# Patient Record
Sex: MALE | Employment: FULL TIME | URBAN - METROPOLITAN AREA
[De-identification: names, ages, dates, MRNs, and addresses within clinical notes are randomized per-mention and may not be internally consistent; named-entity substitution may affect disease eponyms.]

---

## 2019-12-08 ENCOUNTER — APPOINTMENT (OUTPATIENT)
Dept: CT IMAGING | Age: 59
DRG: 066 | End: 2019-12-08
Attending: EMERGENCY MEDICINE
Payer: SELF-PAY

## 2019-12-08 ENCOUNTER — HOSPITAL ENCOUNTER (INPATIENT)
Age: 59
LOS: 1 days | Discharge: NURSING, REHAB OR LONG-TERM CARE | DRG: 066 | End: 2019-12-09
Attending: EMERGENCY MEDICINE | Admitting: HOSPITALIST
Payer: SELF-PAY

## 2019-12-08 DIAGNOSIS — I63.9 CEREBROVASCULAR ACCIDENT (CVA), UNSPECIFIED MECHANISM (HCC): Primary | ICD-10-CM

## 2019-12-08 LAB
AMPHET UR QL SCN: NEGATIVE
ANION GAP BLD CALC-SCNC: 17 MMOL/L (ref 10–20)
ANION GAP SERPL CALC-SCNC: 6 MMOL/L (ref 3–18)
BARBITURATES UR QL SCN: NEGATIVE
BASOPHILS # BLD: 0 K/UL (ref 0–0.1)
BASOPHILS NFR BLD: 0 % (ref 0–2)
BENZODIAZ UR QL: NEGATIVE
BUN BLD-MCNC: 12 MG/DL (ref 7–18)
BUN SERPL-MCNC: 13 MG/DL (ref 7–18)
BUN/CREAT SERPL: 13 (ref 12–20)
CA-I BLD-MCNC: 1.16 MMOL/L (ref 1.12–1.32)
CALCIUM SERPL-MCNC: 9.3 MG/DL (ref 8.5–10.1)
CANNABINOIDS UR QL SCN: NEGATIVE
CHLORIDE BLD-SCNC: 102 MMOL/L (ref 100–108)
CHLORIDE SERPL-SCNC: 107 MMOL/L (ref 100–111)
CK MB CFR SERPL CALC: 0.9 % (ref 0–4)
CK MB SERPL-MCNC: 2 NG/ML (ref 5–25)
CK SERPL-CCNC: 219 U/L (ref 39–308)
CO2 BLD-SCNC: 25 MMOL/L (ref 19–24)
CO2 SERPL-SCNC: 28 MMOL/L (ref 21–32)
COCAINE UR QL SCN: NEGATIVE
CREAT SERPL-MCNC: 1 MG/DL (ref 0.6–1.3)
CREAT UR-MCNC: 1 MG/DL (ref 0.6–1.3)
DIFFERENTIAL METHOD BLD: ABNORMAL
EOSINOPHIL # BLD: 0.3 K/UL (ref 0–0.4)
EOSINOPHIL NFR BLD: 3 % (ref 0–5)
ERYTHROCYTE [DISTWIDTH] IN BLOOD BY AUTOMATED COUNT: 13.3 % (ref 11.6–14.5)
GLUCOSE BLD STRIP.AUTO-MCNC: 88 MG/DL (ref 70–110)
GLUCOSE BLD STRIP.AUTO-MCNC: 91 MG/DL (ref 70–110)
GLUCOSE BLD STRIP.AUTO-MCNC: 94 MG/DL (ref 74–106)
GLUCOSE SERPL-MCNC: 92 MG/DL (ref 74–99)
HCT VFR BLD AUTO: 41.3 % (ref 36–48)
HCT VFR BLD CALC: 41 % (ref 36–49)
HDSCOM,HDSCOM: NORMAL
HGB BLD-MCNC: 13.9 G/DL (ref 12–16)
HGB BLD-MCNC: 14.4 G/DL (ref 13–16)
LYMPHOCYTES # BLD: 2.8 K/UL (ref 0.9–3.6)
LYMPHOCYTES NFR BLD: 31 % (ref 21–52)
MCH RBC QN AUTO: 31.2 PG (ref 24–34)
MCHC RBC AUTO-ENTMCNC: 34.9 G/DL (ref 31–37)
MCV RBC AUTO: 89.4 FL (ref 74–97)
METHADONE UR QL: NEGATIVE
MONOCYTES # BLD: 0.7 K/UL (ref 0.05–1.2)
MONOCYTES NFR BLD: 8 % (ref 3–10)
NEUTS SEG # BLD: 5.4 K/UL (ref 1.8–8)
NEUTS SEG NFR BLD: 58 % (ref 40–73)
OPIATES UR QL: NEGATIVE
PCP UR QL: NEGATIVE
PLATELET # BLD AUTO: 267 K/UL (ref 135–420)
PMV BLD AUTO: 9.8 FL (ref 9.2–11.8)
POTASSIUM BLD-SCNC: 3.4 MMOL/L (ref 3.5–5.5)
POTASSIUM SERPL-SCNC: 3.6 MMOL/L (ref 3.5–5.5)
RBC # BLD AUTO: 4.62 M/UL (ref 4.7–5.5)
SODIUM BLD-SCNC: 140 MMOL/L (ref 136–145)
SODIUM SERPL-SCNC: 141 MMOL/L (ref 136–145)
TROPONIN I SERPL-MCNC: 0.04 NG/ML (ref 0–0.04)
WBC # BLD AUTO: 9.2 K/UL (ref 4.6–13.2)

## 2019-12-08 PROCEDURE — 70450 CT HEAD/BRAIN W/O DYE: CPT

## 2019-12-08 PROCEDURE — 65660000000 HC RM CCU STEPDOWN

## 2019-12-08 PROCEDURE — 82962 GLUCOSE BLOOD TEST: CPT

## 2019-12-08 PROCEDURE — 93005 ELECTROCARDIOGRAM TRACING: CPT

## 2019-12-08 PROCEDURE — 80047 BASIC METABLC PNL IONIZED CA: CPT

## 2019-12-08 PROCEDURE — 80048 BASIC METABOLIC PNL TOTAL CA: CPT

## 2019-12-08 PROCEDURE — 85025 COMPLETE CBC W/AUTO DIFF WBC: CPT

## 2019-12-08 PROCEDURE — 99285 EMERGENCY DEPT VISIT HI MDM: CPT

## 2019-12-08 PROCEDURE — 80307 DRUG TEST PRSMV CHEM ANLYZR: CPT

## 2019-12-08 PROCEDURE — 82550 ASSAY OF CK (CPK): CPT

## 2019-12-08 PROCEDURE — 74011636320 HC RX REV CODE- 636/320: Performed by: EMERGENCY MEDICINE

## 2019-12-08 PROCEDURE — 70496 CT ANGIOGRAPHY HEAD: CPT

## 2019-12-08 RX ADMIN — IOPAMIDOL 100 ML: 755 INJECTION, SOLUTION INTRAVENOUS at 19:52

## 2019-12-08 NOTE — ED TRIAGE NOTES
The patient presents for evaluation of right side numbness/tingling that began at approximately 0800 this morning.

## 2019-12-09 ENCOUNTER — APPOINTMENT (OUTPATIENT)
Dept: MRI IMAGING | Age: 59
DRG: 066 | End: 2019-12-09
Attending: HOSPITALIST
Payer: SELF-PAY

## 2019-12-09 ENCOUNTER — APPOINTMENT (OUTPATIENT)
Dept: GENERAL RADIOLOGY | Age: 59
DRG: 066 | End: 2019-12-09
Attending: HOSPITALIST
Payer: SELF-PAY

## 2019-12-09 ENCOUNTER — APPOINTMENT (OUTPATIENT)
Dept: NON INVASIVE DIAGNOSTICS | Age: 59
DRG: 066 | End: 2019-12-09
Attending: HOSPITALIST
Payer: SELF-PAY

## 2019-12-09 VITALS
WEIGHT: 180 LBS | SYSTOLIC BLOOD PRESSURE: 159 MMHG | DIASTOLIC BLOOD PRESSURE: 94 MMHG | BODY MASS INDEX: 25.2 KG/M2 | OXYGEN SATURATION: 100 % | HEART RATE: 63 BPM | TEMPERATURE: 97.2 F | RESPIRATION RATE: 18 BRPM | HEIGHT: 71 IN

## 2019-12-09 LAB
ATRIAL RATE: 68 BPM
CALCULATED P AXIS, ECG09: 62 DEGREES
CALCULATED R AXIS, ECG10: 1 DEGREES
CALCULATED T AXIS, ECG11: 45 DEGREES
CHOLEST SERPL-MCNC: 143 MG/DL
DIAGNOSIS, 93000: NORMAL
ECHO AO ROOT DIAM: 3.39 CM
ECHO LA AREA 4C: 12.8 CM2
ECHO LA VOL 2C: 55.32 ML (ref 18–58)
ECHO LA VOL 4C: 24.09 ML (ref 18–58)
ECHO LA VOL BP: 39.27 ML (ref 18–58)
ECHO LA VOL/BSA BIPLANE: 19.47 ML/M2 (ref 16–28)
ECHO LA VOLUME INDEX A2C: 27.43 ML/M2 (ref 16–28)
ECHO LA VOLUME INDEX A4C: 11.95 ML/M2 (ref 16–28)
ECHO LV EDV TEICHHOLZ: 0.33 ML
ECHO LV ESV TEICHHOLZ: 0.09 ML
ECHO LV INTERNAL DIMENSION DIASTOLIC: 3.75 CM (ref 4.2–5.9)
ECHO LV INTERNAL DIMENSION SYSTOLIC: 2.23 CM
ECHO LV IVSD: 1.31 CM (ref 0.6–1)
ECHO LV MASS 2D: 202.5 G (ref 88–224)
ECHO LV MASS INDEX 2D: 100.4 G/M2 (ref 49–115)
ECHO LV POSTERIOR WALL DIASTOLIC: 1.32 CM (ref 0.6–1)
ECHO LVOT DIAM: 2.02 CM
ECHO LVOT PEAK GRADIENT: 1.9 MMHG
ECHO LVOT PEAK VELOCITY: 68.12 CM/S
ECHO LVOT VTI: 15.68 CM
ECHO MV A VELOCITY: 44.04 CM/S
ECHO MV E DECELERATION TIME (DT): 329.8 MS
ECHO MV E VELOCITY: 37.57 CM/S
ECHO MV E/A RATIO: 0.85
ECHO PVEIN A DURATION: 120 MS
ECHO PVEIN A VELOCITY: 14.6 CM/S
ECHO RV INTERNAL DIMENSION: 4.31 CM
EST. AVERAGE GLUCOSE BLD GHB EST-MCNC: 111 MG/DL
GLUCOSE BLD STRIP.AUTO-MCNC: 101 MG/DL (ref 70–110)
GLUCOSE BLD STRIP.AUTO-MCNC: 107 MG/DL (ref 70–110)
HBA1C MFR BLD: 5.5 % (ref 4.2–5.6)
HDLC SERPL-MCNC: 46 MG/DL (ref 40–60)
HDLC SERPL: 3.1 {RATIO} (ref 0–5)
LDLC SERPL CALC-MCNC: 77 MG/DL (ref 0–100)
LIPID PROFILE,FLP: NORMAL
LVFS 2D: 40.6 %
LVOT MG: 1.09 MMHG
LVOT MV: 0.5 CM/S
LVSV (TEICH): 21.47 ML
MV DEC SLOPE: 1.14
P-R INTERVAL, ECG05: 162 MS
Q-T INTERVAL, ECG07: 394 MS
QRS DURATION, ECG06: 100 MS
QTC CALCULATION (BEZET), ECG08: 418 MS
T4 FREE SERPL-MCNC: 1 NG/DL (ref 0.7–1.5)
TRIGL SERPL-MCNC: 100 MG/DL (ref ?–150)
TSH SERPL DL<=0.05 MIU/L-ACNC: 4.79 UIU/ML (ref 0.36–3.74)
VENTRICULAR RATE, ECG03: 68 BPM
VLDLC SERPL CALC-MCNC: 20 MG/DL

## 2019-12-09 PROCEDURE — 97161 PT EVAL LOW COMPLEX 20 MIN: CPT

## 2019-12-09 PROCEDURE — 84443 ASSAY THYROID STIM HORMONE: CPT

## 2019-12-09 PROCEDURE — 94762 N-INVAS EAR/PLS OXIMTRY CONT: CPT

## 2019-12-09 PROCEDURE — 93306 TTE W/DOPPLER COMPLETE: CPT

## 2019-12-09 PROCEDURE — 70551 MRI BRAIN STEM W/O DYE: CPT

## 2019-12-09 PROCEDURE — 84439 ASSAY OF FREE THYROXINE: CPT

## 2019-12-09 PROCEDURE — 74011250636 HC RX REV CODE- 250/636: Performed by: HOSPITALIST

## 2019-12-09 PROCEDURE — 36415 COLL VENOUS BLD VENIPUNCTURE: CPT

## 2019-12-09 PROCEDURE — 74011250637 HC RX REV CODE- 250/637: Performed by: HOSPITALIST

## 2019-12-09 PROCEDURE — 71045 X-RAY EXAM CHEST 1 VIEW: CPT

## 2019-12-09 PROCEDURE — 74011250636 HC RX REV CODE- 250/636

## 2019-12-09 PROCEDURE — 97116 GAIT TRAINING THERAPY: CPT

## 2019-12-09 PROCEDURE — 82962 GLUCOSE BLOOD TEST: CPT

## 2019-12-09 PROCEDURE — 74011250636 HC RX REV CODE- 250/636: Performed by: PSYCHIATRY & NEUROLOGY

## 2019-12-09 PROCEDURE — 97165 OT EVAL LOW COMPLEX 30 MIN: CPT

## 2019-12-09 PROCEDURE — 83036 HEMOGLOBIN GLYCOSYLATED A1C: CPT

## 2019-12-09 PROCEDURE — 97535 SELF CARE MNGMENT TRAINING: CPT

## 2019-12-09 PROCEDURE — 74011000250 HC RX REV CODE- 250: Performed by: HOSPITALIST

## 2019-12-09 PROCEDURE — 80061 LIPID PANEL: CPT

## 2019-12-09 PROCEDURE — 92610 EVALUATE SWALLOWING FUNCTION: CPT

## 2019-12-09 RX ORDER — CLOPIDOGREL BISULFATE 75 MG/1
75 TABLET ORAL DAILY
Qty: 30 TAB | Refills: 0 | Status: SHIPPED | OUTPATIENT
Start: 2019-12-09 | End: 2019-12-17 | Stop reason: SDUPTHER

## 2019-12-09 RX ORDER — ASPIRIN 81 MG/1
81 TABLET ORAL DAILY
Qty: 30 TAB | Refills: 0 | Status: SHIPPED | OUTPATIENT
Start: 2019-12-09 | End: 2019-12-17 | Stop reason: SDUPTHER

## 2019-12-09 RX ORDER — IBUPROFEN 200 MG
1 TABLET ORAL DAILY
Status: DISCONTINUED | OUTPATIENT
Start: 2019-12-09 | End: 2019-12-09 | Stop reason: HOSPADM

## 2019-12-09 RX ORDER — ATORVASTATIN CALCIUM 40 MG/1
80 TABLET, FILM COATED ORAL
Status: DISCONTINUED | OUTPATIENT
Start: 2019-12-09 | End: 2019-12-09 | Stop reason: HOSPADM

## 2019-12-09 RX ORDER — HEPARIN SODIUM 5000 [USP'U]/ML
5000 INJECTION, SOLUTION INTRAVENOUS; SUBCUTANEOUS EVERY 8 HOURS
Status: DISCONTINUED | OUTPATIENT
Start: 2019-12-09 | End: 2019-12-09 | Stop reason: HOSPADM

## 2019-12-09 RX ORDER — POLYETHYLENE GLYCOL 3350 17 G/17G
17 POWDER, FOR SOLUTION ORAL DAILY PRN
Status: DISCONTINUED | OUTPATIENT
Start: 2019-12-09 | End: 2019-12-09 | Stop reason: HOSPADM

## 2019-12-09 RX ORDER — AMLODIPINE BESYLATE 10 MG/1
10 TABLET ORAL DAILY
Qty: 30 TAB | Refills: 0 | Status: SHIPPED | OUTPATIENT
Start: 2019-12-09 | End: 2019-12-17 | Stop reason: SDUPTHER

## 2019-12-09 RX ORDER — NALOXONE HYDROCHLORIDE 0.4 MG/ML
0.4 INJECTION, SOLUTION INTRAMUSCULAR; INTRAVENOUS; SUBCUTANEOUS AS NEEDED
Status: DISCONTINUED | OUTPATIENT
Start: 2019-12-09 | End: 2019-12-09 | Stop reason: HOSPADM

## 2019-12-09 RX ORDER — LISINOPRIL 10 MG/1
10 TABLET ORAL DAILY
Qty: 30 TAB | Refills: 0 | Status: SHIPPED | OUTPATIENT
Start: 2019-12-09 | End: 2019-12-17 | Stop reason: SDUPTHER

## 2019-12-09 RX ORDER — ATORVASTATIN CALCIUM 80 MG/1
80 TABLET, FILM COATED ORAL
Qty: 30 TAB | Refills: 0 | Status: SHIPPED | OUTPATIENT
Start: 2019-12-09 | End: 2019-12-17 | Stop reason: SDUPTHER

## 2019-12-09 RX ORDER — ASPIRIN 325 MG
325 TABLET ORAL ONCE
Status: COMPLETED | OUTPATIENT
Start: 2019-12-09 | End: 2019-12-09

## 2019-12-09 RX ORDER — ASPIRIN 325 MG
325 TABLET ORAL DAILY
Status: DISCONTINUED | OUTPATIENT
Start: 2019-12-10 | End: 2019-12-09 | Stop reason: HOSPADM

## 2019-12-09 RX ORDER — LORAZEPAM 2 MG/ML
INJECTION INTRAMUSCULAR
Status: COMPLETED
Start: 2019-12-09 | End: 2019-12-09

## 2019-12-09 RX ORDER — DIPHENHYDRAMINE HYDROCHLORIDE 50 MG/ML
25 INJECTION, SOLUTION INTRAMUSCULAR; INTRAVENOUS
Status: DISCONTINUED | OUTPATIENT
Start: 2019-12-09 | End: 2019-12-09 | Stop reason: HOSPADM

## 2019-12-09 RX ORDER — ACETAMINOPHEN 325 MG/1
650 TABLET ORAL
Status: DISCONTINUED | OUTPATIENT
Start: 2019-12-09 | End: 2019-12-09 | Stop reason: HOSPADM

## 2019-12-09 RX ORDER — FAMOTIDINE 20 MG/1
20 TABLET, FILM COATED ORAL EVERY 12 HOURS
Status: DISCONTINUED | OUTPATIENT
Start: 2019-12-09 | End: 2019-12-09 | Stop reason: HOSPADM

## 2019-12-09 RX ORDER — SODIUM CHLORIDE 0.9 % (FLUSH) 0.9 %
5-40 SYRINGE (ML) INJECTION AS NEEDED
Status: DISCONTINUED | OUTPATIENT
Start: 2019-12-09 | End: 2019-12-09 | Stop reason: HOSPADM

## 2019-12-09 RX ORDER — LABETALOL HCL 20 MG/4 ML
5 SYRINGE (ML) INTRAVENOUS
Status: DISCONTINUED | OUTPATIENT
Start: 2019-12-09 | End: 2019-12-09 | Stop reason: HOSPADM

## 2019-12-09 RX ORDER — SODIUM CHLORIDE 0.9 % (FLUSH) 0.9 %
5-40 SYRINGE (ML) INJECTION EVERY 8 HOURS
Status: DISCONTINUED | OUTPATIENT
Start: 2019-12-09 | End: 2019-12-09 | Stop reason: HOSPADM

## 2019-12-09 RX ORDER — SODIUM CHLORIDE 9 MG/ML
10 INJECTION INTRAMUSCULAR; INTRAVENOUS; SUBCUTANEOUS
Status: COMPLETED | OUTPATIENT
Start: 2019-12-09 | End: 2019-12-09

## 2019-12-09 RX ORDER — POLYETHYLENE GLYCOL 3350 17 G/17G
17 POWDER, FOR SOLUTION ORAL
Qty: 30 PACKET | Refills: 0 | Status: SHIPPED | OUTPATIENT
Start: 2019-12-09

## 2019-12-09 RX ORDER — ONDANSETRON 2 MG/ML
4 INJECTION INTRAMUSCULAR; INTRAVENOUS
Status: DISCONTINUED | OUTPATIENT
Start: 2019-12-09 | End: 2019-12-09 | Stop reason: HOSPADM

## 2019-12-09 RX ORDER — LORAZEPAM 2 MG/ML
1 INJECTION INTRAMUSCULAR ONCE
Status: COMPLETED | OUTPATIENT
Start: 2019-12-09 | End: 2019-12-09

## 2019-12-09 RX ORDER — IBUPROFEN 200 MG
1 TABLET ORAL DAILY
Qty: 30 PATCH | Refills: 0 | Status: SHIPPED | OUTPATIENT
Start: 2019-12-10 | End: 2019-12-17 | Stop reason: SDUPTHER

## 2019-12-09 RX ADMIN — FAMOTIDINE 20 MG: 20 TABLET ORAL at 08:40

## 2019-12-09 RX ADMIN — HEPARIN SODIUM 5000 UNITS: 5000 INJECTION INTRAVENOUS; SUBCUTANEOUS at 06:37

## 2019-12-09 RX ADMIN — LORAZEPAM: 2 INJECTION, SOLUTION INTRAMUSCULAR; INTRAVENOUS at 10:00

## 2019-12-09 RX ADMIN — ASPIRIN 325 MG ORAL TABLET 325 MG: 325 PILL ORAL at 06:36

## 2019-12-09 RX ADMIN — LORAZEPAM 1 MG: 2 INJECTION INTRAMUSCULAR; INTRAVENOUS at 09:04

## 2019-12-09 RX ADMIN — ATORVASTATIN CALCIUM 80 MG: 40 TABLET, FILM COATED ORAL at 06:36

## 2019-12-09 RX ADMIN — Medication 10 ML: at 06:37

## 2019-12-09 RX ADMIN — SODIUM CHLORIDE 10 ML: 9 INJECTION, SOLUTION INTRAMUSCULAR; INTRAVENOUS; SUBCUTANEOUS at 11:00

## 2019-12-09 RX ADMIN — FAMOTIDINE 20 MG: 20 TABLET ORAL at 06:36

## 2019-12-09 NOTE — PROGRESS NOTES
ARU/IPR REFERRAL CONTACT NOTE  [address] [place] [address] for Physical Rehabilitation    RE: Conception Luis    Referral received to review this patient's case for admission to [address] [place] [address] for Physical Rehabilitation. Current status reviewed.  When appropriate, will need PT/OT evaluation/treatment on this patient to complete the pre-admission evaluation.  Will continue to follow. Thank you for this referral.  Should you have any questions please do not hesitate to call. Sincerely,  Vick Bloom.  [address] [place] [address] Physical Rehabilitation  [phone]

## 2019-12-09 NOTE — CONSULTS
HPI:  63 y/o male admitted yesterday with a CC of right sided weakness and numbness he woke up with. Came late yesterday, out of window for tPA. Has recovered some, but still feels week, walking weak, right F/A/L numb and weak still. MRI brain reviewed showed a small focus acute stroke mid posterior limb left internal capsule possibly involving a small portion of the adjacent left thalamus, along with minimal WMD. CTA head/neck showed no LVO, no stenosis. Echo prelim showed EF at 56-60%. Social History     Socioeconomic History    Marital status: SINGLE     Spouse name: Not on file    Number of children: Not on file    Years of education: Not on file    Highest education level: Not on file   Occupational History    Not on file   Social Needs    Financial resource strain: Not on file    Food insecurity:     Worry: Not on file     Inability: Not on file    Transportation needs:     Medical: Not on file     Non-medical: Not on file   Tobacco Use    Smoking status: Current Every Day Smoker     Packs/day: 1.00     Years: 1.00     Pack years: 1.00    Smokeless tobacco: Never Used   Substance and Sexual Activity    Alcohol use: No    Drug use: Not on file    Sexual activity: Not on file   Lifestyle    Physical activity:     Days per week: Not on file     Minutes per session: Not on file    Stress: Not on file   Relationships    Social connections:     Talks on phone: Not on file     Gets together: Not on file     Attends Druze service: Not on file     Active member of club or organization: Not on file     Attends meetings of clubs or organizations: Not on file     Relationship status: Not on file    Intimate partner violence:     Fear of current or ex partner: Not on file     Emotionally abused: Not on file     Physically abused: Not on file     Forced sexual activity: Not on file   Other Topics Concern    Not on file   Social History Narrative    Not on file       History reviewed.  No pertinent family history. Current Facility-Administered Medications   Medication Dose Route Frequency Provider Last Rate Last Dose    LORazepam (ATIVAN) 2 mg/mL injection             sodium chloride (NS) flush 5-40 mL  5-40 mL IntraVENous Q8H Theador [place] A, DO   10 mL at 12/09/19 3428    sodium chloride (NS) flush 5-40 mL  5-40 mL IntraVENous PRN Tessa Dyers, DO        labetalol (NORMODYNE;TRANDATE) 20 mg/4 mL (5 mg/mL) injection 5 mg  5 mg IntraVENous Q10MIN PRN Tessa Dyers, DO        heparin (porcine) injection 5,000 Units  5,000 Units SubCUTAneous Q8H [facility] [place] A, DO   5,000 Units at 12/09/19 0046    polyethylene glycol (MIRALAX) packet 17 g  17 g Oral DAILY PRN Tessa Dyers, DO        famotidine (PEPCID) tablet 20 mg  20 mg Oral Q12H [facility] [place] [facility] A, DO   20 mg at 12/09/19 0840    acetaminophen (TYLENOL) tablet 650 mg  650 mg Oral Q4H PRN Tessa Carvalho, DO        atorvastatin (LIPITOR) tablet 80 mg  80 mg Oral QHS [facility] [place] [facility] A, DO   80 mg at 12/09/19 0636    naloxone [facility]) injection 0.4 mg  0.4 mg IntraVENous PRN Tessa Dyers, DO        diphenhydrAMINE (BENADRYL) injection 25 mg  25 mg IntraVENous Q4H PRN [facility] [place] A, DO        ondansetron [facility]) injection 4 mg  4 mg IntraVENous Q4H PRN Tessa Dyers, DO        nicotine (NICODERM CQ) 21 mg/24 hr patch 1 Patch  1 Patch TransDERmal DAILY [facility] [place] A, DO   1 Patch at 12/09/19 0840    [START ON 12/10/2019] aspirin tablet 325 mg  325 mg Oral DAILY Krystyna Zimmerman MD           Past Medical History:   Diagnosis Date    Hypertension        History reviewed. No pertinent surgical history.     Allergies   Allergen Reactions    Motrin [Ibuprofen] Hives       Patient Active Problem List   Diagnosis Code    CVA (cerebral vascular accident) ([facility] [address] I63.9    Essential hypertension I10    Male erectile disorder N52.9    Tobacco abuse Z72.0         Review of Systems:   Constitutional: no fever or chills  Skin denies rash or itching  HEENT:  Denies tinnitus, hearing loss, or visual changes  Respiratory: denies shortness of breath  Cardiovascular: denies chest pain, dyspnea on exertion  Gastrointestinal: does not report nausea or vomiting  Genitourinary: does not report dysuria or incontinence  Musculoskeletal: does not report joint pain or swelling  Endocrine: denies weight change  Hematology: denies easy bruising or bleeding   Neurological: as above in HPI      PHYSICAL EXAMINATION:         Vital signs:    Visit Vitals  /78   Pulse 67   Temp 97.7 °F (36.5 °C)   Resp 18   Ht 5' 11\" (1.803 m)   Wt 81.6 kg (180 lb)   SpO2 99%   BMI 25.10 kg/m²         GENERAL:                  Well developed, well nourished, in no apparent distress. HEART:                       RR, no murmurs  EXTREMITIES:           No edema is identified. Pulses are +2. HEAD:                         Normocephalic, atraumatic. NEUROLOGIC EXAMINATION       MENTAL STATUS:     Awake, alert, and oriented x 4. Attention and STM are grossly normal. There is no aphasia. Fund of knowledge is adequate. Mood and affect are appropriate  CRANIAL NERVES:   Visual fields are full to confrontation. Pupils are reactive to light and accommodation. Fundi are normal.   Extraocular movements are intact and there is no nystagmus. Facial sensation is normal  Face shows mild right NLF flat. Hearing is present. SCM/TPZ 5/5  Tongue protrudes midline, palate elevates symmetrically. MOTOR:          5-/5 RUE/RLE with right sided drift. normal tone. CEREBELLAR:           No tremors or dysmetria     SENSORY:     Normal distal pinprick, proprioception, and vibration. Romberg is not tested. DTR's:                         +2 throughout, no long tract signs                 GAIT:                           Normal gait    I have personally reviewed imaging studies. Impression: S/P left thalamocapsular syndrome with right HP and heminumbness, partially resolved. 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 Plan: 1-Cont Atorvastatin 80mg qhs.    2-HTN management   3-DAPT upon D/H with Plavix 75mg every day and ASA 81 mg every day. 4-Rehab plan pending, although looks like he will not need inpt rehab.    5-F/U with neuro outpatient in 1-2 wks for stroke f/u, consideration of MEL evaluation. 6-OK to D/H from neuro standpoint. PLEASE NOTE:   Portions of this document may have been produced using voice recognition software. Unrecognized errors in transcription may be present. This note will not be viewable in [address]

## 2019-12-09 NOTE — PROGRESS NOTES
Problem: Dysphagia (Adult)  Goal: *Acute Goals and Plan of Care (Insert Text)  Description  Patient will:  1. Tolerate PO trials with 0 s/s overt distress in 4/5 trials  2. Utilize compensatory swallow strategies/maneuvers (decrease bite/sip, size/rate, alt. liq/sol) with min cues in 4/5 trials    Rec:     Reg solid with thin liquids  Aspiration precautions  HOB >45 during po intake, remain >30 for 30-45 minutes after po   Small bites/sips; alternate liquid/solid with slow feeding rate   Oral care TID  Meds per pt preference   Outcome: Progressing Towards Goal     SPEECH LANGUAGE PATHOLOGY BEDSIDE SWALLOW EVALUATION    Patient: Wally Au (2 y.o. male)  Date: 12/9/2019  Primary Diagnosis: CVA (cerebral vascular accident) ([facility] [address] [I63.9]        Precautions: aspiration     PLOF: As per H&P    ASSESSMENT :  Based on the objective data described below, the patient presents with oropharyngeal swallow fxn essentially [facility]. Strength, ROM, and coordination of the orofacial musculature were all found to be [facility]. Min right orofacial droop/weakness noted. The pt presented with adequate oral transit times on all consistencies. Mastication time was appropriate. No s/sx of aspiration noted; hyolaryngeal elevation and excursion appeared adequate on all consistencies. No oral stasis noted post swallow. The pt denied globus sensation post swallow. Speech production was fluent. No dysarthria was observed. Expressive/receptive speech production appeared WFL to informal observation. Pt communicated in sentences of appropriate form, content, and use. Rec reg solid diet with thin liquids, aspiration precautions, oral care TID, and meds as tolerated. ST will continue to follow x 1-2 visits to ensure safety of diet tolerance. Patient will benefit from skilled intervention to address the above impairments.   Patient's rehabilitation potential is considered to be Good  Factors which may influence rehabilitation potential include:   [x] None noted     PLAN :  Recommendations and Planned Interventions: See above  Frequency/Duration: Patient will be followed by speech-language pathology x 1-2 more visits to address goals. Discharge Recommendations: None     SUBJECTIVE:   Patient stated I need to get home. It is just me and my 15year old. OBJECTIVE:     Past Medical History:   Diagnosis Date    Hypertension    History reviewed. No pertinent surgical history. Prior Level of Function/Home Situation: see below  Home Situation  Home Environment: Private residence  # Steps to Enter: 3  One/Two Story Residence: One story  Living Alone: No  Support Systems: Family member(s)  Patient Expects to be Discharged to[de-identified] Private residence  Current DME Used/Available at Home: None    Diet prior to admission: reg solid with thin liquids  Current Diet:  reg solid with thin liquids     Cognitive and Communication Status:  Neurologic State: Alert  Orientation Level: Oriented X4  Cognition: Follows commands  Oral Assessment:  Oral Assessment  Labial: Right droop(min)  Dentition: Natural;Intact  Oral Hygiene: adequate  Lingual: No impairment  Velum: No impairment  Mandible: No impairment  P.O. Trials:  Patient Position: 50 at [facility]  Vocal quality prior to P.O.: No impairment  Consistency Presented: Thin liquid; Solid  How Presented: Self-fed/presented;Cup/sip;Straw;Spoon  Bolus Acceptance: No impairment  Bolus Formation/Control: No impairment  Propulsion: No impairment  Oral Residue: None  Initiation of Swallow: No impairment  Laryngeal Elevation: Functional  Aspiration Signs/Symptoms: None  Pharyngeal Phase Characteristics: No impairment, issues, or problems   Effective Modifications: None  Cues for Modifications: None     Oral Phase Severity: No impairment  Pharyngeal Phase Severity : No impairment    PAIN:  Start of Eval: 0  End of Eval: 0     After treatment:   []            Patient left in no apparent distress sitting up in chair  [x]            Patient left in no apparent distress in bed  [x]            Call bell left within reach  [x]            Nursing notified  []            Family present  []            Caregiver present  []            Bed alarm activated    COMMUNICATION/EDUCATION:   [x]            Aspiration precautions; swallow safety; compensatory techniques. [x]            Patient/family have participated as able in goal setting and plan of care. [x]         Posted safety precautions in patient's room.     Thank you for this referral.    Sd Villavicencio M.S. CCC-SLP/L  Speech-Language Pathologist

## 2019-12-09 NOTE — ROUTINE PROCESS
TRANSFER - IN REPORT: 
 
Verbal report received from ED (name) on Codi Frazier  being received from [place] (unit) for routine progression of care Report consisted of patients Situation, Background, Assessment and  
Recommendations(SBAR). Information from the following report(s) SBAR was reviewed with the receiving nurse. Opportunity for questions and clarification was provided. Assessment completed upon patients arrival to unit and care assumed.

## 2019-12-09 NOTE — PROGRESS NOTES
PHYSICAL THERAPY EVALUATION AND DISCHARGE    Patient: Eev Fragoso (43 y.o. male)  Date: 12/9/2019  Primary Diagnosis: CVA (cerebral vascular accident) [facility]) [I63.9]    Precautions: (Standard)  PLOF: Independent with mobility and I/ADL's, working in [employer]. ASSESSMENT :  Patient is 62yo M admitted to hospital for Right sided weakness and tingling and presents today alert and agreeable to therapy, supine in bed upon arrival. Patient transferred to sitting EOB for objective assessment and demos RLE trace strength deficits. Patient ambulated 15ft with no AD and then used SPC in left hand for 250ft with improved step length and decreased trunk sway. Patient returned to room and sat EOB for education on recommendations for [facility] use and follow up with OP PT. Patient does not require further skilled intervention at this level of care. PLAN :  Recommendations and Planned Interventions:  No formal PT needs identified at this time. Discharge Recommendations: Outpatient  Further Equipment Recommendations for Discharge: straight cane     SUBJECTIVE:   Patient stated \"Do you think they'll let me drive?     OBJECTIVE DATA SUMMARY:     Past Medical History:   Diagnosis Date    Hypertension    History reviewed. No pertinent surgical history. Barriers to Learning/Limitations: None  Compensate with: N/A  Home Situation:   Home Situation  Home Environment: Private residence  # Steps to Enter: 3  Rails to Enter: No  One/Two Story Residence: One story  Living Alone: No  Support Systems: Family member(s)  Patient Expects to be Discharged to[de-identified] Private residence  Current DME Used/Available at Home: None  Tub or Shower Type: Tub/Shower combination  Critical Behavior:  Neurologic State: Alert  Orientation Level: Oriented X4  Cognition: Follows commands  Safety/Judgement: Fall prevention   Strength:    Strength:  Within functional limits   Tone & Sensation:   Tone: Normal   Sensation: Impaired   Range Of Motion:  AROM: Within functional limits(BLE)   Functional Mobility:  Bed Mobility:   Supine to Sit: Modified independent  Sit to Supine: Modified independent  Scooting: Modified independent  Transfers:  Sit to Stand: Supervision  Stand to Sit: Supervision  Balance:   Sitting: Intact  Standing: Impaired; With support  Standing - Static: Good  Standing - Dynamic : Fair  Ambulation/Gait Training:  Distance (ft): 250 Feet (ft)  Assistive Device: Cane, straight    Gait Abnormalities: Decreased step clearance   Speed/Myriam: Slow   Interventions: Verbal cues; Visual/Demos  Pain:  Pain level pre-treatment: 0/10   Pain level post-treatment: 0/10    Activity Tolerance:   Patient tolerated activity well and was left resting with call bell by his side. Please refer to the flowsheet for vital signs taken during this treatment. After treatment:   []         Patient left in no apparent distress sitting up in chair  [x]         Patient left in no apparent distress in bed  [x]         Call bell left within reach  []         Nursing notified  []         Caregiver present  []         Bed alarm activated  []         SCDs applied    COMMUNICATION/EDUCATION:   [x]         Role of Physical Therapy in the acute care setting. [x]         Fall prevention education was provided and the patient/caregiver indicated understanding. [x]         Patient/family have participated as able in goal setting and plan of care. [x]         Patient/family agree to work toward stated goals and plan of care. []         Patient understands intent and goals of therapy, but is neutral about his/her participation. []         Patient is unable to participate in goal setting/plan of care: ongoing with therapy staff.  []         Other:     Thank you for this referral.  Michelle Petit, PT   Time Calculation: 15 mins      Eval Complexity: History: LOW Complexity : Zero comorbidities / personal factors that will impact the outcome / POCExam:LOW Complexity : 1-2 Standardized tests and measures addressing body structure, function, activity limitation and / or participation in recreation  Presentation: LOW Complexity : Stable, uncomplicated  Clinical Decision Making:Low Complexity    Overall Complexity:LOW

## 2019-12-09 NOTE — ROUTINE PROCESS
As part of the discharge instructions, medications already given today were discussed with the patient. The next dose due of all ordered meds was highlighted as part of the medication discharge instructions. Discussed with the patient the importance of taking medications as directed, as well as the side effects and adverse reactions to medications ordered. The patient was counseled on the dangers of tobacco use, and was advised to quit. Reviewed strategies to maximize success, including written materials and pharmacotherapy (patch).

## 2019-12-09 NOTE — ED PROVIDER NOTES
EMERGENCY DEPARTMENT HISTORY AND PHYSICAL EXAM    9:55 PM  Date: 12/8/2019  Patient Name: Bong Doll    History of Presenting Illness     Chief Complaint   Patient presents with    Numbness        History Provided By: Patient    HPI: Bong Doll is a 64 y.o. male with history of hypertension. Patient is presenting with right-sided numbness since he woke up this morning at 8 AM.  Denies prior similar episodes. No history of chest pain or shortness of breath. Location:  Severity:  Timing/course: Onset/Duration:     PCP: None    Past History     Past Medical History:  Past Medical History:   Diagnosis Date    Hypertension        Past Surgical History:  History reviewed. No pertinent surgical history. Family History:  History reviewed. No pertinent family history. Social History:  Social History     Tobacco Use    Smoking status: Current Every Day Smoker     Packs/day: 1.00     Years: 1.00     Pack years: 1.00    Smokeless tobacco: Never Used   Substance Use Topics    Alcohol use: No    Drug use: Not on file       Allergies: Allergies   Allergen Reactions    Motrin [Ibuprofen] Hives       Review of Systems   Review of Systems   Neurological: Positive for facial asymmetry, weakness and numbness. All other systems reviewed and are negative. Physical Exam     Patient Vitals for the past 12 hrs:   Temp Pulse Resp BP SpO2   12/08/19 2028     100 %   12/08/19 2015 98.9 °F (37.2 °C) 67 13 (!) 146/98 100 %   12/08/19 1930  70 11 (!) 146/105 99 %   12/08/19 1915  76 14 (!) 155/100 100 %   12/08/19 1847 98.7 °F (37.1 °C) 85 14 (!) 179/96 100 %       Physical Exam  Vitals signs and nursing note reviewed. Constitutional:       Appearance: Normal appearance. HENT:      Head: Normocephalic and atraumatic. Mouth/Throat:      Mouth: Mucous membranes are dry. Eyes:      Extraocular Movements: Extraocular movements intact.    Neck:      Musculoskeletal: Normal range of motion and neck supple. Cardiovascular:      Rate and Rhythm: Normal rate. Pulmonary:      Effort: Pulmonary effort is normal. No respiratory distress. Musculoskeletal:         General: No deformity. Skin:     General: Skin is warm and dry. Neurological:      Mental Status: He is alert and oriented to person, place, and time. Cranial Nerves: Facial asymmetry present. Comments: Right-sided flat nasolabial fold. Right pronator drift. Right-sided limb ataxia. Right-sided subjective numbness   Psychiatric:         Mood and Affect: Mood normal.         Behavior: Behavior normal.         Diagnostic Study Results     Labs -  Recent Results (from the past 12 hour(s))   GLUCOSE, POC    Collection Time: 12/08/19  6:47 PM   Result Value Ref Range    Glucose (POC) 91 70 - 777 mg/dL   METABOLIC PANEL, BASIC    Collection Time: 12/08/19  7:09 PM   Result Value Ref Range    Sodium 141 136 - 145 mmol/L    Potassium 3.6 3.5 - 5.5 mmol/L    Chloride 107 100 - 111 mmol/L    CO2 28 21 - 32 mmol/L    Anion gap 6 3.0 - 18 mmol/L    Glucose 92 74 - 99 mg/dL    BUN 13 7.0 - 18 MG/DL    Creatinine 1.00 0.6 - 1.3 MG/DL    BUN/Creatinine ratio 13 12 - 20      GFR est AA >60 >60 ml/min/1.73m2    GFR est non-AA >60 >60 ml/min/1.73m2    Calcium 9.3 8.5 - 10.1 MG/DL   CBC WITH AUTOMATED DIFF    Collection Time: 12/08/19  7:09 PM   Result Value Ref Range    WBC 9.2 4.6 - 13.2 K/uL    RBC 4.62 (L) 4.70 - 5.50 M/uL    HGB 14.4 13.0 - 16.0 g/dL    HCT 41.3 36.0 - 48.0 %    MCV 89.4 74.0 - 97.0 FL    MCH 31.2 24.0 - 34.0 PG    MCHC 34.9 31.0 - 37.0 g/dL    RDW 13.3 11.6 - 14.5 %    PLATELET 535 467 - 159 K/uL    MPV 9.8 9.2 - 11.8 FL    NEUTROPHILS 58 40 - 73 %    LYMPHOCYTES 31 21 - 52 %    MONOCYTES 8 3 - 10 %    EOSINOPHILS 3 0 - 5 %    BASOPHILS 0 0 - 2 %    ABS. NEUTROPHILS 5.4 1.8 - 8.0 K/UL    ABS. LYMPHOCYTES 2.8 0.9 - 3.6 K/UL    ABS. MONOCYTES 0.7 0.05 - 1.2 K/UL    ABS. EOSINOPHILS 0.3 0.0 - 0.4 K/UL    ABS.  BASOPHILS 0.0 0.0 - 0.1 K/UL    DF AUTOMATED     CARDIAC PANEL,(CK, CKMB & TROPONIN)    Collection Time: 12/08/19  7:09 PM   Result Value Ref Range     39 - 308 U/L    CK - MB 2.0 <3.6 ng/ml    CK-MB Index 0.9 0.0 - 4.0 %    Troponin-I, QT 0.04 0.0 - 0.045 NG/ML   POC CHEM8    Collection Time: 12/08/19  7:30 PM   Result Value Ref Range    CO2, POC 25 (H) 19 - 24 MMOL/L    Glucose, POC 94 74 - 106 MG/DL    BUN, POC 12 7 - 18 MG/DL    Creatinine, POC 1.0 0.6 - 1.3 MG/DL    GFRAA, POC >60 >60 ml/min/1.73m2    GFRNA, POC >60 >60 ml/min/1.73m2    Sodium,  136 - 145 MMOL/L    Potassium, POC 3.4 (L) 3.5 - 5.5 MMOL/L    Calcium, ionized (POC) 1.16 1.12 - 1.32 mmol/L    Chloride,  100 - 108 MMOL/L    Anion gap, POC 17 10 - 20      Hematocrit, POC 41 36 - 49 %    Hemoglobin, POC 13.9 12 - 16 G/DL   EKG, 12 LEAD, INITIAL    Collection Time: 12/08/19  8:07 PM   Result Value Ref Range    Ventricular Rate 68 BPM    Atrial Rate 68 BPM    P-R Interval 162 ms    QRS Duration 100 ms    Q-T Interval 394 ms    QTC Calculation (Bezet) 418 ms    Calculated P Axis 62 degrees    Calculated R Axis 1 degrees    Calculated T Axis 45 degrees    Diagnosis       Normal sinus rhythm  Normal ECG  When compared with ECG of 29-SEP-2010 06:09,  ST no longer depressed in Lateral leads  Nonspecific T wave abnormality no longer evident in Inferior leads  Nonspecific T wave abnormality no longer evident in Lateral leads  QT has shortened     DRUG SCREEN, URINE    Collection Time: 12/08/19  8:10 PM   Result Value Ref Range    BENZODIAZEPINES NEGATIVE  NEG      BARBITURATES NEGATIVE  NEG      THC (TH-CANNABINOL) NEGATIVE  NEG      OPIATES NEGATIVE  NEG      PCP(PHENCYCLIDINE) NEGATIVE  NEG      COCAINE NEGATIVE  NEG      AMPHETAMINES NEGATIVE  NEG      METHADONE NEGATIVE  NEG      HDSCOM (NOTE)        Radiologic Studies -   Ct Head Wo Cont    Result Date: 12/8/2019  IMPRESSION: No acute intracranial findings.  Motion artifact limits evaluation in the lower brain. Of note, MRI is more sensitive for detecting acute infarct. Findings called to WICHO Pratt at 1908 hours, 12/8/2019        Medical Decision Making     ED Course: Progress Notes, Reevaluation, and Consults:    9:55 PM Initial assessment performed. The patients presenting problems have been discussed, and they/their family are in agreement with the care plan formulated and outlined with them. I have encouraged them to ask questions as they arise throughout their visit. Provider Notes (Medical Decision Making): 82-year-old male presenting with a right-sided facial, upper extremity, lower extremity numbness as well as right limb ataxia and right-sided pronator drift. Code S was activated. Patient is out of the window for TPA but could possibly benefit from IRF if he has an LVO. Procedures:     Critical Care Time: Upon my evaluation, this patient had a high probability of imminent or life-threatening deterioration due to possible stroke, which required my direct attention, intervention, and personal management. I have personally provided 35 minutes of critical care time exclusive of time spent on separately billable procedures. Time includes review of laboratory data, radiology results, discussion with consultants, and monitoring for potential decompensation. Interventions were performed as documented above. Shukri Farfan MD  9:56 PM        Vital Signs-Reviewed the patient's vital signs. Reviewed pt's pulse ox reading. EKG: Interpreted by the EP. Time Interpreted:    Rate:    Rhythm:    Interpretation:   Comparison:     Records Reviewed: Nursing Notes (Time of Review: 9:55 PM)  -I am the first provider for this patient.  -I reviewed the vital signs, available nursing notes, past medical history, past surgical history, family history and social history. Current Outpatient Medications   Medication Sig Dispense Refill    lisinopril (PRINIVIL, ZESTRIL) 10 mg tablet Take  by mouth daily.  amLODIPine (NORVASC) 10 mg tablet Take  by mouth daily.  cloNIDine (CATAPRES) 0.1 mg tablet Take  by mouth two (2) times a day.  oxyCODONE-acetaminophen (PERCOCET) 5-325 mg per tablet Take 1 Tab by mouth every four (4) hours as needed for Pain. 20 Tab 0        Clinical Impression     Clinical Impression:   1. Cerebrovascular accident (CVA), unspecified mechanism ([facility] [address]        Disposition: Admit    This note was dictated utilizing voice recognition software which may lead to typographical errors. I apologize in advance if the situation occurs. If questions arise please do not hesitate to contact me or call our department.     Elisabeth Blue MD  9:55 PM

## 2019-12-09 NOTE — PROGRESS NOTES
Problem: Self Care Deficits Care Plan (Adult)  Goal: *Acute Goals and Plan of Care (Insert Text)  Outcome: Resolved/Met     OCCUPATIONAL THERAPY EVALUATION/DISCHARGE    Patient: Chidi Turk (74 y.o. male)  Date: 12/9/2019  Primary Diagnosis: CVA (cerebral vascular accident) ([facility] [address] [I63.9]  Precautions: (Standard)  PLOF: Patient was independent with self-care and functional mobility PTA. 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 ASSESSMENT AND RECOMMENDATIONS:  Pt cleared to participate in OT evaluation by RN. Upon entering the room, the pt was supine in bed, alert, and agreeable to participate in OT evaluation. Pt performed bed mobility with additional time, modified independence and was able to sit EOB for objective assessment with G balance. Pt modified independent with lower body dressing this session and for self-feeding. Pt observed with increased time needed to manipulate objects. Pt able to identify LT correctly on BUE in 4/4 trials, however still reports numbness in RUE. Pt supervision for functional transfers using no AD. The pt presents with good static standing and fair dynamic standing balance, however will defer to PT for functional balance and functional mobility tasks as pt observed ambulating to BR with wide base of support this session. Based on the objective data described below, the patient presents with no deficits that impede pt function with selfcare or functional transfers in preporation for ADLs. OT to recommend Outpatient follow up for sensation and fine motor tasks. At this time patient is safe to d/c home with family support. OT to d/c from caseload at this time. Skilled occupational therapy is not indicated at this time.   Discharge Recommendations: Outpatient  Further Equipment Recommendations for Discharge: N/A      SUBJECTIVE:   Patient stated I don't see how coming to the hospital has helped me    OBJECTIVE DATA SUMMARY:     Past Medical History: Diagnosis Date    Hypertension    History reviewed. No pertinent surgical history. Barriers to Learning/Limitations: None  Compensate with: visual, verbal, tactile, kinesthetic cues/model    Home Situation:   Home Situation  Home Environment: Private residence  # Steps to Enter: 3  Rails to Enter: No  One/Two Story Residence: One story  Living Alone: No  Support Systems: Family member(s)  Patient Expects to be Discharged to[de-identified] Private residence  Current DME Used/Available at Home: None  Tub or Shower Type: Tub/Shower combination  [x]     Right hand dominant   []     Left hand dominant    Cognitive/Behavioral Status:  Neurologic State: Alert  Orientation Level: Oriented X4  Cognition: Follows commands  Safety/Judgement: Fall prevention    Skin: Intact  Edema: None noted    Vision/Perceptual:    Acuity: Within Defined Limits;Able to read clock/calendar on wall without difficulty; Able to read normal print without difficulty    Corrective Lenses: Reading glasses    Coordination: BUE  Fine Motor Skills-Upper: Left Intact; Right Impaired    Gross Motor Skills-Upper: Left Intact; Right Intact    Balance:  Sitting: Intact  Standing: Impaired; Without support  Standing - Static: Good  Standing - Dynamic : Fair    Strength: BUE  Strength:  Within functional limits    Tone & Sensation: BUE  Tone: Normal  Sensation: Impaired(numbness in RUE)    Range of Motion: BUE  AROM: Within functional limits      Functional Mobility and Transfers for ADLs:  Bed Mobility:  Supine to Sit: Modified independent  Sit to Supine: Modified independent  Scooting: Modified independent    Transfers:  Sit to Stand: Supervision  Stand to Sit: Supervision    ADL Assessment:  Feeding: Modified independent    Oral Facial Hygiene/Grooming: Modified Independent    Bathing: Modified independent    Upper Body Dressing: Modified independent    Lower Body Dressing: Modified independent    Toileting: Modified independent    ADL Intervention:  Feeding  Feeding Assistance: Modified independent  Container Management: Modified independent(additional time needed for manipulation of objects)  Cutting Food: Modified indpendent  Utensil Management: Modified independent    Lower Body Dressing Assistance  Dressing Assistance: Modified independent(additional time)  Pants With Elastic Waist: Modified independent  Socks: Modified independent  Position Performed: Bending forward method;Seated edge of bed;Standing    Cognitive Retraining  Safety/Judgement: Fall prevention    Pain:  Pain level pre-treatment: 0/10   Pain level post-treatment: 0/10   Pain Intervention(s): Medication (see MAR); Response to intervention: See doc flow    Activity Tolerance:   Patient demonstrated good activity tolerance during OT evaluation. Please refer to the flowsheet for vital signs taken during this treatment. After treatment:   []  Patient left in no apparent distress sitting up in chair  [x]  Patient left in no apparent distress in bed  [x]  Call bell left within reach  [x]  Nursing notified  []  Caregiver present  []  Bed alarm activated    COMMUNICATION/EDUCATION:   [x]      Role of Occupational Therapy in the acute care setting  [x]      Home safety education was provided and the patient/caregiver indicated understanding. [x]      Patient/family have participated as able and agree with findings and recommendations. []      Patient is unable to participate in plan of care at this time. Thank you for this referral.  Geovany Sweeney OTR/L  Time Calculation: 23 mins      Eval Complexity: History: MEDIUM Complexity : Expanded review of history including physical, cognitive and psychosocial  history ; Examination: LOW Complexity : 1-3 performance deficits relating to physical, cognitive , or psychosocial skils that result in activity limitations and / or participation restrictions ;    Decision Making:LOW Complexity : No comorbidities that affect functional and no verbal or physical assistance needed to complete eval tasks

## 2019-12-09 NOTE — PROGRESS NOTES
Preliminary interpretation CTA head and neck:    No acute abnormalities. Advanced degenerative disc disease. Full report to follow.      Catherine Velasquez, [id number] [facility]

## 2019-12-09 NOTE — ED NOTES
TRANSFER - OUT REPORT:    Verbal report given to Soumya(name) on Pavithra Jim  being transferred to (unit) for routine progression of care       Report consisted of patients Situation, Background, Assessment and   Recommendations(SBAR). Information from the following report(s) SBAR, ED Summary, MAR, Accordion, Recent Results and Cardiac Rhythm NSR was reviewed with the receiving nurse. Lines:   Peripheral IV 59/95/46 Right Basilic (Active)   Site Assessment Clean, dry, & intact 12/8/2019  7:12 PM   Phlebitis Assessment 0 12/8/2019  7:12 PM   Infiltration Assessment 0 12/8/2019  7:12 PM   Dressing Status Clean, dry, & intact 12/8/2019  7:12 PM   Dressing Type 4 X 4;Transparent;Tape 12/8/2019  7:12 PM   Hub Color/Line Status Green;Patent; Flushed 12/8/2019  7:12 PM        Opportunity for questions and clarification was provided.       Patient transported with:   Registered Nurse

## 2019-12-09 NOTE — PROGRESS NOTES
conducted an initial consultation and Spiritual Assessment for Kisha Davey, who is a 64 y. o.,male. Patients Primary Language is: [place]. According to the patients EMR Anabaptist Affiliation is: [place]. The reason the Patient came to the hospital is:   Patient Active Problem List    Diagnosis Date Noted    CVA (cerebral vascular accident) ([facility] [address] 12/08/2019    Essential hypertension 12/13/2016    Male erectile disorder 12/13/2016    Tobacco abuse 12/13/2016        The  provided the following Interventions:  Initiated a relationship of care and support. Explored issues of justus, belief, spirituality and Bahai/ritual needs while hospitalized. Listened empathically. Provided chaplaincy education. Provided information about Spiritual Care Services. Offered assurance of prayers on patient's behalf. Chart reviewed. The following outcomes where achieved:  Patient shared limited information about both their medical narrative and spiritual journey/beliefs.  confirmed Patient's Anabaptist Affiliation. Patient processed feeling about current hospitalization. Patient expressed gratitude for 's visit. Assessment:  Patient does not have any Bahai/cultural needs that will affect patients preferences in health care. There are no spiritual or Bahai issues which require intervention at this time. Plan:  Chaplains will continue to follow and will provide pastoral care on an as needed/requested basis.  recommends bedside caregivers page  on duty if patient shows signs of acute spiritual or emotional distress.     [address]   [phone]

## 2019-12-09 NOTE — PROGRESS NOTES
Preliminary interpretation CTA head and neck:    No acute abnormalities. Full report to follow.      [facility]

## 2019-12-09 NOTE — DISCHARGE INSTRUCTIONS
Patient armband removed and shredded  As part of the discharge instructions, medications already given today were discussed with the patient. The next dose due of all ordered meds was highlighted as part of the medication discharge instructions. Discussed with the patient the importance of taking medications as directed, as well as the side effects and adverse reactions to medications ordered. MyChart Activation    Thank you for requesting access to [facility]. Please follow the instructions below to securely access and download your online medical record. [facility] allows you to send messages to your doctor, view your test results, renew your prescriptions, schedule appointments, and more. How Do I Sign Up? 1. In your internet browser, go to [email or web address]  2. Click on the First Time User? Click Here link in the Sign In box. You will be redirect to the New Member Sign Up page. 3. Enter your [facility] Access Code exactly as it appears below. You will not need to use this code after youve completed the sign-up process. If you do not sign up before the expiration date, you must request a new code. [facility] Access Code: [email or web address]  Expires: 2020  6:45 PM (This is the date your [facility] access code will )    4. Enter the last four digits of your Social Security Number (xxxx) and Date of Birth (mm/dd/yyyy) as indicated and click Submit. You will be taken to the next sign-up page. 5. Create a [facility] ID. This will be your [facility] login ID and cannot be changed, so think of one that is secure and easy to remember. 6. Create a [facility] password. You can change your password at any time. 7. Enter your Password Reset Question and Answer. This can be used at a later time if you forget your password. 8. Enter your e-mail address. You will receive e-mail notification when new information is available in [address] 9. Click Sign Up.  You can now view and download portions of your medical record. 10. Click the Download Summary menu link to download a portable copy of your medical information. Additional Information    If you have questions, please visit the Frequently Asked Questions section of the [facility] website at [email or web address] Remember, MyChart is NOT to be used for urgent needs. For medical emergencies, dial 911. Patient Education        Learning About Antiplatelet Medicines After a Stroke  Introduction    If you have had a stroke, you may have concerns about having another one. You want to do all you can do to avoid this. If your stroke was caused by a blood clot, one of the best things you can do is to take antiplatelet medicines. They can help prevent another stroke. In most cases, you don't take them if you had a stroke caused by a leak in an artery. These medicines are often called blood thinners. But they don't thin your blood. They work to keep platelets from sticking together and forming blood clots. (A platelet is a type of blood cell.) Blood clots can cause a stroke if they block a blood vessel in the brain. So by preventing blood clots, you are helping to prevent a stroke. Examples  · Aspirin (Javier, Bufferin, Ecotrin)  · Aspirin with dipyridamole (Aggrenox)  · Clopidogrel (Plavix)  Possible side effects  These medicines make your blood take longer than normal to clot. This can cause bleeding, and you may bruise easily. In rare cases, they can cause you to bleed inside your body without an injury. If you have an injury, you might have bleeding that is hard to control. These medicines may have other side effects. Depending on which one you take, you may:  · Have diarrhea. · Feel sick to your stomach. · Have a headache. · Have some mild belly pain. You may have other side effects or reactions not listed here. Check the information that comes with your medicine.   What to know about taking this medicine  · Be sure you get instructions about how to take your medicine safely. Blood thinners can cause serious bleeding problems. · Be safe with medicines. Take your medicines exactly as prescribed. Call your doctor if you think you are having a problem with your medicine. · Check with your doctor or pharmacist before you use any other medicines, including over-the-counter medicines. Make sure your doctor knows all of the medicines, vitamins, herbal products, and supplements you take. Taking some medicines together can cause problems. Where can you learn more? Go to http://hunter-luanne.info/. Enter P915 in the search box to learn more about \"Learning About Antiplatelet Medicines After a Stroke. \"  Current as of: April 9, 2019  Content Version: 12.2  © 8845-3690 [employer]. Care instructions adapted under license by [employer] (which disclaims liability or warranty for this information). If you have questions about a medical condition or this instruction, always ask your healthcare professional. [address] any warranty or liability for your use of this information. Patient Education        Home Blood Pressure Test: About This Test  What is it? A home blood pressure test allows you to keep track of your blood pressure at home. Blood pressure is a measure of the force of blood against the walls of your arteries. Blood pressure readings include two numbers, such as 130/80 (say \"130 over 80\"). The first number is the systolic pressure. The second number is the diastolic pressure. Why is this test done? You may do this test at home to:  · Find out if you have high blood pressure. · Track your blood pressure if you have high blood pressure. · Track how well medicine is working to reduce high blood pressure. · Check how lifestyle changes, such as weight loss and exercise, are affecting blood pressure.   How can you prepare for the test?  · Do not use caffeine, tobacco, or medicines known to raise blood pressure (such as nasal decongestant sprays) for at least 30 minutes before taking your blood pressure. · Do not exercise for at least 30 minutes before taking your blood pressure. What happens before the test?  Take your blood pressure while you feel comfortable and relaxed. Sit quietly with both feet on the floor for at least 5 minutes before the test.  What happens during the test?  · Sit with your arm slightly bent and resting on a table so that your upper arm is at the same level as your heart. · Roll up your sleeve or take off your shirt to expose your upper arm. · Wrap the blood pressure cuff around your upper arm so that the lower edge of the cuff is about 1 inch above the bend of your elbow. Proceed with the following steps depending on if you are using an automatic or manual pressure monitor. Automatic blood pressure monitors  · Press the on/off button on the automatic monitor and wait until the ready-to-measure \"heart\" symbol appears next to zero in the display window. · Press the start button. The cuff will inflate and deflate by itself. · Your blood pressure numbers will appear on the screen. · Write your numbers in your log book, along with the date and time. Manual blood pressure monitors  · Place the earpieces of a stethoscope in your ears, and place the bell of the stethoscope over the artery, just below the cuff. · Close the valve on the rubber inflating bulb. · Squeeze the bulb rapidly with your opposite hand to inflate the cuff until the dial or column of mercury reads about 30 mm Hg higher than your usual systolic pressure. If you do not know your usual pressure, inflate the cuff to 210 mm Hg or until the pulse at your wrist disappears. · Open the pressure valve just slightly by twisting or pressing the valve on the bulb.   · As you watch the pressure slowly fall, note the level on the dial at which you first start to hear a pulsing or tapping sound through the stethoscope. This is your systolic blood pressure. · Continue letting the air out slowly. The sounds will become muffled and will finally disappear. Note the pressure when the sounds completely disappear. This is your diastolic blood pressure. Let out all the remaining air. · Write your numbers in your log book, along with the date and time. What else should you know about the test?  It is more accurate to take the average of several readings made throughout the day than to rely on a single reading. It's normal for blood pressure to go up and down throughout the day. Follow-up care is a key part of your treatment and safety. Be sure to make and go to all appointments, and call your doctor if you are having problems. It's also a good idea to keep a list of the medicines you take. Where can you learn more? Go to http://hunter-luanne.info/. Enter C427 in the search box to learn more about \"Home Blood Pressure Test: About This Test.\"  Current as of: April 9, 2019  Content Version: 12.2  © 5891-0216 [employer]. Care instructions adapted under license by [employer] (which disclaims liability or warranty for this information). If you have questions about a medical condition or this instruction, always ask your healthcare professional. [address] any warranty or liability for your use of this information. Patient Education        DASH Diet: Care Instructions  Your Care Instructions    The DASH diet is an eating plan that can help lower your blood pressure. DASH stands for Dietary Approaches to Stop Hypertension. Hypertension is high blood pressure. The DASH diet focuses on eating foods that are high in calcium, potassium, and magnesium. These nutrients can lower blood pressure. The foods that are highest in these nutrients are fruits, vegetables, low-fat dairy products, nuts, seeds, and legumes.  But taking calcium, potassium, and magnesium supplements instead of eating foods that are high in those nutrients does not have the same effect. The DASH diet also includes whole grains, fish, and poultry. The DASH diet is one of several lifestyle changes your doctor may recommend to lower your high blood pressure. Your doctor may also want you to decrease the amount of sodium in your diet. Lowering sodium while following the DASH diet can lower blood pressure even further than just the DASH diet alone. Follow-up care is a key part of your treatment and safety. Be sure to make and go to all appointments, and call your doctor if you are having problems. It's also a good idea to know your test results and keep a list of the medicines you take. How can you care for yourself at home? Following the DASH diet  · Eat 4 to 5 servings of fruit each day. A serving is 1 medium-sized piece of fruit, ½ cup chopped or canned fruit, 1/4 cup dried fruit, or 4 ounces (½ cup) of fruit juice. Choose fruit more often than fruit juice. · Eat 4 to 5 servings of vegetables each day. A serving is 1 cup of lettuce or raw leafy vegetables, ½ cup of chopped or cooked vegetables, or 4 ounces (½ cup) of vegetable juice. Choose vegetables more often than vegetable juice. · Get 2 to 3 servings of low-fat and fat-free dairy each day. A serving is 8 ounces of milk, 1 cup of yogurt, or 1 ½ ounces of cheese. · Eat 6 to 8 servings of grains each day. A serving is 1 slice of bread, 1 ounce of dry cereal, or ½ cup of cooked rice, pasta, or cooked cereal. Try to choose whole-grain products as much as possible. · Limit lean meat, poultry, and fish to 2 servings each day. A serving is 3 ounces, about the size of a deck of cards. · Eat 4 to 5 servings of nuts, seeds, and legumes (cooked dried beans, lentils, and split peas) each week. A serving is 1/3 cup of nuts, 2 tablespoons of seeds, or ½ cup of cooked beans or peas. · Limit fats and oils to 2 to 3 servings each day.  A serving is 1 teaspoon of vegetable oil or 2 tablespoons of salad dressing. · Limit sweets and added sugars to 5 servings or less a week. A serving is 1 tablespoon jelly or jam, ½ cup sorbet, or 1 cup of lemonade. · Eat less than 2,300 milligrams (mg) of sodium a day. If you limit your sodium to 1,500 mg a day, you can lower your blood pressure even more. Tips for success  · Start small. Do not try to make dramatic changes to your diet all at once. You might feel that you are missing out on your favorite foods and then be more likely to not follow the plan. Make small changes, and stick with them. Once those changes become habit, add a few more changes. · Try some of the following:  ? Make it a goal to eat a fruit or vegetable at every meal and at snacks. This will make it easy to get the recommended amount of fruits and vegetables each day. ? Try yogurt topped with fruit and nuts for a snack or healthy dessert. ? Add lettuce, tomato, cucumber, and onion to sandwiches. ? Combine a ready-made pizza crust with low-fat mozzarella cheese and lots of vegetable toppings. Try using tomatoes, squash, spinach, broccoli, carrots, cauliflower, and onions. ? Have a variety of cut-up vegetables with a low-fat dip as an appetizer instead of chips and dip. ? Sprinkle sunflower seeds or chopped almonds over salads. Or try adding chopped walnuts or almonds to cooked vegetables. ? Try some vegetarian meals using beans and peas. Add garbanzo or kidney beans to salads. Make burritos and tacos with mashed guardado beans or black beans. Where can you learn more? Go to http://hunter-luanne.info/. Enter W482 in the search box to learn more about \"DASH Diet: Care Instructions. \"  Current as of: April 9, 2019  Content Version: 12.2  © 6653-0907 [employer]. Care instructions adapted under license by [employer] (which disclaims liability or warranty for this information).  If you have questions about a medical condition or this instruction, always ask your healthcare professional. [address] any warranty or liability for your use of this information. Patient Education        High Blood Pressure: Care Instructions  Overview    It's normal for blood pressure to go up and down throughout the day. But if it stays up, you have high blood pressure. Another name for high blood pressure is hypertension. Despite what a lot of people think, high blood pressure usually doesn't cause headaches or make you feel dizzy or lightheaded. It usually has no symptoms. But it does increase your risk of stroke, heart attack, and other problems. You and your doctor will talk about your risks of these problems based on your blood pressure. Your doctor will give you a goal for your blood pressure. Your goal will be based on your health and your age. Lifestyle changes, such as eating healthy and being active, are always important to help lower blood pressure. You might also take medicine to reach your blood pressure goal.  Follow-up care is a key part of your treatment and safety. Be sure to make and go to all appointments, and call your doctor if you are having problems. It's also a good idea to know your test results and keep a list of the medicines you take. How can you care for yourself at home? Medical treatment  · If you stop taking your medicine, your blood pressure will go back up. You may take one or more types of medicine to lower your blood pressure. Be safe with medicines. Take your medicine exactly as prescribed. Call your doctor if you think you are having a problem with your medicine. · Talk to your doctor before you start taking aspirin every day. Aspirin can help certain people lower their risk of a heart attack or stroke. But taking aspirin isn't right for everyone, because it can cause serious bleeding. · See your doctor regularly.  You may need to see the doctor more often at first or until your blood pressure comes down. · If you are taking blood pressure medicine, talk to your doctor before you take decongestants or anti-inflammatory medicine, such as ibuprofen. Some of these medicines can raise blood pressure. · Learn how to check your blood pressure at home. Lifestyle changes  · Stay at a healthy weight. This is especially important if you put on weight around the waist. Losing even 10 pounds can help you lower your blood pressure. · If your doctor recommends it, get more exercise. Walking is a good choice. Bit by bit, increase the amount you walk every day. Try for at least 30 minutes on most days of the week. You also may want to swim, bike, or do other activities. · Avoid or limit alcohol. Talk to your doctor about whether you can drink any alcohol. · Try to limit how much sodium you eat to less than 2,300 milligrams (mg) a day. Your doctor may ask you to try to eat less than 1,500 mg a day. · Eat plenty of fruits (such as bananas and oranges), vegetables, legumes, whole grains, and low-fat dairy products. · Lower the amount of saturated fat in your diet. Saturated fat is found in animal products such as milk, cheese, and meat. Limiting these foods may help you lose weight and also lower your risk for heart disease. · Do not smoke. Smoking increases your risk for heart attack and stroke. If you need help quitting, talk to your doctor about stop-smoking programs and medicines. These can increase your chances of quitting for good. When should you call for help? Call  911 anytime you think you may need emergency care. This may mean having symptoms that suggest that your blood pressure is causing a serious heart or blood vessel problem. Your blood pressure may be over 180/120.   For example, call  911 if:    · You have symptoms of a heart attack. These may include:  ? Chest pain or pressure, or a strange feeling in the chest.  ? Sweating. ? Shortness of breath.   ? Nausea or vomiting. ? Pain, pressure, or a strange feeling in the back, neck, jaw, or upper belly or in one or both shoulders or arms. ? Lightheadedness or sudden weakness. ? A fast or irregular heartbeat.     · You have symptoms of a stroke. These may include:  ? Sudden numbness, tingling, weakness, or loss of movement in your face, arm, or leg, especially on only one side of your body. ? Sudden vision changes. ? Sudden trouble speaking. ? Sudden confusion or trouble understanding simple statements. ? Sudden problems with walking or balance. ? A sudden, severe headache that is different from past headaches.     · You have severe back or belly pain.    Do not wait until your blood pressure comes down on its own. Get help right away.   Call your doctor now or seek immediate care if:    · Your blood pressure is much higher than normal (such as 180/120 or higher), but you don't have symptoms.     · You think high blood pressure is causing symptoms, such as:  ? Severe headache.  ? Blurry vision.    Watch closely for changes in your health, and be sure to contact your doctor if:    · Your blood pressure measures higher than your doctor recommends at least 2 times. That means the top number is higher or the bottom number is higher, or both.     · You think you may be having side effects from your blood pressure medicine. Where can you learn more? Go to http://hunter-luanne.info/. Enter E865 in the search box to learn more about \"High Blood Pressure: Care Instructions. \"  Current as of: April 9, 2019  Content Version: 12.2  © 1102-2186 [employer]. Care instructions adapted under license by [employer] (which disclaims liability or warranty for this information). If you have questions about a medical condition or this instruction, always ask your healthcare professional. [place] [address] any warranty or liability for your use of this information.          Patient Education        Learning About an Ischemic Stroke  What is an ischemic stroke? An ischemic (say \"wpk-HKG-alpj\") stroke occurs when a blood clot blocks a blood vessel in the brain. This means that blood cannot flow to some part of the brain. Without blood and the oxygen it carries, this part of the brain starts to die. The part of the body controlled by the damaged area of the brain cannot work properly. This is different from a hemorrhagic (say \"rgl-ite-KE-shanaeck\") stroke, which happens when a blood vessel in the brain has burst open or has started to leak. The brain damage from a stroke starts within minutes. Quick treatment can help limit damage to the brain and make recovery more likely. People who have had a stroke may have a hard time talking, understanding things, and making decisions. They may have to relearn daily activities, such as how to eat, bathe, and dress. How well someone recovers from a stroke depends on how quickly the person gets to the hospital, where in the brain the stroke happened, and how severe it was. Training and therapy also make a difference in how well people recover. What are the symptoms? If you have any of these symptoms, call 911 or other emergency services right away:  · You have symptoms of a stroke. These may include:  ? Sudden numbness, tingling, weakness, or loss of movement in your face, arm, or leg, especially on only one side of your body. ? Sudden vision changes. ? Sudden trouble speaking. ? Sudden confusion or trouble understanding simple statements. ? Sudden problems with walking or balance. ? A sudden, severe headache that is different from past headaches. See your doctor if you have symptoms that seem like a stroke, even if they go away quickly. You may have had a transient ischemic attack (TIA), sometimes called a mini-stroke. A TIA is a warning that a stroke may happen soon. Getting early treatment for a TIA can help prevent a stroke.   What causes an ischemic stroke? An ischemic stroke is caused by a blood clot that blocks blood flow in the brain. The most common causes of blood clots include:  · Hardening of the arteries (atherosclerosis). This is caused by high blood pressure, diabetes, high cholesterol, or smoking. · Atrial fibrillation. How is ischemic stroke treated? Emergency treatment may be done to restore blood flow to the brain using medicine or a procedure. You may take medicines to help prevent another stroke. Ask your doctor if a stroke rehab program is right for you. How can you prevent another stroke? · Work with your doctor to treat any health problems you have. High blood pressure, high cholesterol, atrial fibrillation, and diabetes all raise your chances of having a stroke. · Be safe with medicines. Take your medicine exactly as prescribed. Call your doctor if you think you are having a problem with your medicine. · Have a healthy lifestyle. ? Do not smoke or allow others to smoke around you. If you need help quitting, talk to your doctor about stop-smoking programs and medicines. These can increase your chances of quitting for good. Smoking makes a stroke more likely. ? Limit alcohol to 2 drinks a day for men and 1 drink a day for women. ? Lose weight if you need to. A healthy weight will help you keep your heart and body healthy. ? Be active. Ask your doctor what type and level of activity is safe for you.  ? Eat heart-healthy foods, like fruits, vegetables, and high-fiber foods. Follow-up care is a key part of your treatment and safety. Be sure to make and go to all appointments, and call your doctor if you are having problems. It's also a good idea to know your test results and keep a list of the medicines you take. Where can you learn more? Go to http://hunter-luanne.info/. Enter D161 in the search box to learn more about \"Learning About an Ischemic Stroke. \"  Current as of: September 26, 2018  Content Version: 12.2  © 2860-2192 [employer]. Care instructions adapted under license by [employer] (which disclaims liability or warranty for this information). If you have questions about a medical condition or this instruction, always ask your healthcare professional. [address] any warranty or liability for your use of this information. Patient Education        Acute High Blood Pressure: Care Instructions  Your Care Instructions    Acute high blood pressure is very high blood pressure. It's a serious problem. Very high blood pressure can damage your brain, heart, eyes, and kidneys. You may have been given medicines to lower your blood pressure. You may have gotten them as pills or through a needle in one of your veins. This is called an IV. And maybe you were given other medicines too. These can be needed when high blood pressure causes other problems. To keep your blood pressure at a lower level, you may need to make healthy lifestyle changes. And you will probably need to take medicines. Be sure to follow up with your doctor about your blood pressure and what you can do about it. Follow-up care is a key part of your treatment and safety. Be sure to make and go to all appointments, and call your doctor if you are having problems. It's also a good idea to know your test results and keep a list of the medicines you take. How can you care for yourself at home? · See your doctor as often as he or she recommends. This is to make sure your blood pressure is under control. You will probably go at least 2 times a year. But it may be more often at first.  · Take your blood pressure medicine exactly as prescribed. You may take one or more types. They include diuretics, beta-blockers, ACE inhibitors, calcium channel blockers, and angiotensin II receptor blockers. Call your doctor if you think you are having a problem with your medicine.   · If you take blood pressure medicine, talk to your doctor before you take decongestants or anti-inflammatory medicine, such as ibuprofen. These can raise blood pressure. · Learn how to check your blood pressure at home. Check it often. · Ask your doctor if it's okay to drink alcohol. · Talk to your doctor about lifestyle changes that can help blood pressure. These include being active and managing your weight. · Don't smoke. Smoking increases your risk for heart attack and stroke. When should you call for help? Call  911 anytime you think you may need emergency care. This may mean having symptoms that suggest that your blood pressure is causing a serious heart or blood vessel problem. Your blood pressure may be over 180/120.   For example, call  911 if:    · You have symptoms of a heart attack. These may include:  ? Chest pain or pressure, or a strange feeling in the chest.  ? Sweating. ? Shortness of breath. ? Nausea or vomiting. ? Pain, pressure, or a strange feeling in the back, neck, jaw, or upper belly or in one or both shoulders or arms. ? Lightheadedness or sudden weakness. ? A fast or irregular heartbeat.     · You have symptoms of a stroke. These may include:  ? Sudden numbness, tingling, weakness, or loss of movement in your face, arm, or leg, especially on only one side of your body. ? Sudden vision changes. ? Sudden trouble speaking. ? Sudden confusion or trouble understanding simple statements. ? Sudden problems with walking or balance. ? A sudden, severe headache that is different from past headaches.     · You have severe back or belly pain.    Do not wait until your blood pressure comes down on its own.  Get help right away.   Call your doctor now or seek immediate care if:    · Your blood pressure is much higher than normal (such as 180/120 or higher), but you don't have symptoms.     · You think high blood pressure is causing symptoms, such as:  ? Severe headache.  ? Blurry vision.    Watch closely for changes in your health, and be sure to contact your doctor if:    · Your blood pressure measures higher than your doctor recommends at least 2 times. That means the top number is higher or the bottom number is higher, or both.     · You think you may be having side effects from your blood pressure medicine. Where can you learn more? Go to http://hunter-luanne.info/. Enter B268 in the search box to learn more about \"Acute High Blood Pressure: Care Instructions. \"  Current as of: April 9, 2019  Content Version: 12.2  © 9006-3592 [employer]. Care instructions adapted under license by [employer] (which disclaims liability or warranty for this information). If you have questions about a medical condition or this instruction, always ask your healthcare professional. [address] any warranty or liability for your use of this information. Patient Education        Low Sodium Diet (2,000 Milligram): Care Instructions  Your Care Instructions    Too much sodium causes your body to hold on to extra water. This can raise your blood pressure and force your heart and kidneys to work harder. In very serious cases, this could cause you to be put in the hospital. It might even be life-threatening. By limiting sodium, you will feel better and lower your risk of serious problems. The most common source of sodium is salt. People get most of the salt in their diet from canned, prepared, and packaged foods. Fast food and restaurant meals also are very high in sodium. Your doctor will probably limit your sodium to less than 2,000 milligrams (mg) a day. This limit counts all the sodium in prepared and packaged foods and any salt you add to your food. Follow-up care is a key part of your treatment and safety. Be sure to make and go to all appointments, and call your doctor if you are having problems.  It's also a good idea to know your test results and keep a list of the medicines you take. How can you care for yourself at home? Read food labels  · Read labels on cans and food packages. The labels tell you how much sodium is in each serving. Make sure that you look at the serving size. If you eat more than the serving size, you have eaten more sodium. · Food labels also tell you the Percent Daily Value for sodium. Choose products with low Percent Daily Values for sodium. · Be aware that sodium can come in forms other than salt, including monosodium glutamate (MSG), sodium citrate, and sodium bicarbonate (baking soda). MSG is often added to Asian food. When you eat out, you can sometimes ask for food without MSG or added salt. Buy low-sodium foods  · Buy foods that are labeled \"unsalted\" (no salt added), \"sodium-free\" (less than 5 mg of sodium per serving), or \"low-sodium\" (less than 140 mg of sodium per serving). Foods labeled \"reduced-sodium\" and \"light sodium\" may still have too much sodium. Be sure to read the label to see how much sodium you are getting. · Buy fresh vegetables, or frozen vegetables without added sauces. Buy low-sodium versions of canned vegetables, soups, and other canned goods. Prepare low-sodium meals  · Cut back on the amount of salt you use in cooking. This will help you adjust to the taste. Do not add salt after cooking. One teaspoon of salt has about 2,300 mg of sodium. · Take the salt shaker off the table. · Flavor your food with garlic, lemon juice, onion, vinegar, herbs, and spices. Do not use soy sauce, lite soy sauce, steak sauce, onion salt, garlic salt, celery salt, mustard, or ketchup on your food. · Use low-sodium salad dressings, sauces, and ketchup. Or make your own salad dressings and sauces without adding salt. · Use less salt (or none) when recipes call for it. You can often use half the salt a recipe calls for without losing flavor. Other foods such as rice, pasta, and grains do not need added salt.   · Rinse canned vegetables, and cook them in fresh water. This removes some--but not all--of the salt. · Avoid water that is naturally high in sodium or that has been treated with water softeners, which add sodium. Call your local water company to find out the sodium content of your water supply. If you buy bottled water, read the label and choose a sodium-free brand. Avoid high-sodium foods  · Avoid eating:  ? Smoked, cured, salted, and canned meat, fish, and poultry. ? Ham, daniels, hot dogs, and luncheon meats. ? Regular, hard, and processed cheese and regular peanut butter. ? Crackers with salted tops, and other salted snack foods such as pretzels, chips, and salted popcorn. ? Frozen prepared meals, unless labeled low-sodium. ? Canned and dried soups, broths, and bouillon, unless labeled sodium-free or low-sodium. ? Canned vegetables, unless labeled sodium-free or low-sodium. ? [place] fries, pizza, tacos, and other fast foods. ? Pickles, olives, ketchup, and other condiments, especially soy sauce, unless labeled sodium-free or low-sodium. Where can you learn more? Go to http://hunter-luanne.info/. Enter T498 in the search box to learn more about \"Low Sodium Diet (2,000 Milligram): Care Instructions. \"  Current as of: November 7, 2018  Content Version: 12.2  © 4132-9715 [employer]. Care instructions adapted under license by [employer] (which disclaims liability or warranty for this information). If you have questions about a medical condition or this instruction, always ask your healthcare professional. [place] [address] any warranty or liability for your use of this information. Patient Education        Stopping Smokeless Tobacco Use: Care Instructions  Your Care Instructions    Smokeless tobacco comes in many forms, such as snuff and chewing tobacco:  · Snuff is finely ground tobacco sold in cans or pouches.  Most of the time, snuff is used by putting a \"pinch\" or \"dip\" between the lower lip or cheek and the gum. · Chewing tobacco is sold as loose leaves, plugs, or twists. It is chewed or placed between the cheek and the gum or teeth. There are plenty of reasons to stop using smokeless tobacco. These products are harmful. They are not risk-free alternatives to smoking. Smokeless tobacco contains nicotine, which is addicting. Though using smokeless tobacco is less harmful than smoking cigarettes, it can cause serious health problems, such as:  · White patches or red sores in your mouth that can turn into mouth cancer involving the lip, tongue, or cheek. · Tooth loss and other dental problems. · Gum disease. Your gums may pull away from your teeth and not grow back. People who use smokeless tobacco crave the nicotine in it. Giving up smokeless tobacco is much harder than simply changing a habit. Your body has to stop craving the nicotine. It is hard to quit, but you can do it. Many tools are available for people who want to quit using smokeless tobacco. You may find that combining tools works best for you. There are several steps to quitting. First you get ready to quit. Then you get support to help you. After that, you learn new skills and behaviors to quit. For many people, a necessary step is getting and using medicine. Your doctor will help you set up the plan that best meets your needs. You may want to attend a tobacco cessation program. When you choose a program, look for one that has proven success. Ask your doctor for ideas. You will greatly increase your chances of success if you take medicine as well as get counseling or join a cessation program.  Some of the changes you feel when you first quit smokeless tobacco are uncomfortable. Your body will miss the nicotine at first, and you may feel short-tempered and grumpy. You may have trouble sleeping or concentrating. Medicine can help you deal with these symptoms.  You may struggle with changing your habits and rituals. The last step is the tricky one: Be prepared for the urge to use smokeless tobacco to continue for a time. This is a lot to deal with, but keep at it. You will feel better. Follow-up care is a key part of your treatment and safety. Be sure to make and go to all appointments, and call your doctor if you are having problems. It's also a good idea to know your test results and keep a list of the medicines you take. How can you care for yourself at home? · Ask your family, friends, and coworkers for support. You have a better chance of quitting if you have help and support. · Join a support group for people who are trying to quit using smokeless tobacco.  · Set a quit date. Pick your date carefully so that it is not right in the middle of a big deadline or stressful time. After you quit, do not use smokeless tobacco even once. Get rid of all spit cups, cans, and pouches after your last use. Clean your house and your clothes so that they do not smell of tobacco.  · Learn how to be a non-user. Think about ways you can avoid those things that make you reach for tobacco.  ? Learn some ways to deal with cravings, like calling a friend or going for a walk. Cravings often pass. ? Avoid situations that put you at greatest risk for using smokeless tobacco. For some people, it is hard to spend time with friends without dipping or chewing. For others, they might skip a coffee break with coworkers who smoke or use smokeless tobacco.  ? Change your daily routine. Take a different route to work, or eat a meal in a different place. · Cut down on stress. Calm yourself or release tension by doing an activity you enjoy, such as reading a book, taking a hot bath, or gardening. · Talk to your doctor or pharmacist about nicotine replacement therapy. You still get nicotine, but you do not use tobacco. Nicotine replacement products help you slowly reduce the amount of nicotine you need.  Many of these products are available over the counter. They include nicotine patches, gum, lozenges, and inhalers. · Ask your doctor about bupropion (Wellbutrin) or varenicline (Chantix), which are prescription medicines. They do not contain nicotine. They help you by reducing withdrawal symptoms, such as stress and anxiety. · Get regular exercise. Having healthy habits will help your body move past its craving for nicotine. · Be prepared to keep trying. Most people are not successful the first few times they try to quit. Do not get mad at yourself if you use tobacco again. Make a list of things you learned, and think about when you want to try again, such as next week, next month, or next year. Where can you learn more? Go to http://hunter-luanne.info/. Enter U267 in the search box to learn more about \"Stopping Smokeless Tobacco Use: Care Instructions. \"  Current as of: September 26, 2018  Content Version: 12.2  © 5286-2373 [employer]. Care instructions adapted under license by [employer] (which disclaims liability or warranty for this information). If you have questions about a medical condition or this instruction, always ask your healthcare professional. [place] [address] any warranty or liability for your use of this information. Patient Education        Deciding About Using Medicines To Quit Smoking  How can you decide about using medicines to quit smoking? What are the medicines you can use? Your doctor may prescribe varenicline (Chantix) or bupropion (Zyban). These medicines can help you cope with cravings for tobacco. They are pills that don't contain nicotine. You also can use nicotine replacement products. These do contain nicotine. There are many types. · Gum and lozenges slowly release nicotine into your mouth. · Patches stick to your skin. They slowly release nicotine into your bloodstream.  · An inhaler has a benitez that contains nicotine.  You breathe in a puff of nicotine vapor through your mouth and throat. · Nasal spray releases a mist that contains nicotine. What are key points about this decision? · Using medicines can double your chances of quitting smoking. They can ease cravings and withdrawal symptoms. · Getting counseling along with using medicine can raise your chances of quitting even more. · If you smoke fewer than 5 cigarettes a day, you may not need medicines to help you quit smoking. · These medicines have less nicotine than cigarettes. And by itself, nicotine is not nearly as harmful as smoking. The tars, carbon monoxide, and other toxic chemicals in tobacco cause the harmful effects. · The side effects of nicotine replacement products depend on the type of product. For example, a patch can make your skin red and itchy. Medicines in pill form can make you sick to your stomach. They can also cause dry mouth and trouble sleeping. For most people, the side effects are not bad enough to make them stop using the products. Why might you choose to use medicines to quit smoking? · You have tried on your own to stop smoking, but you were not able to stop. · You smoke more than 5 cigarettes a day. · You want to increase your chances of quitting smoking. · You want to reduce your cravings and withdrawal symptoms. · You feel the benefits of medicine outweigh the side effects. Why might you choose not to use medicine? · You want to try quitting on your own by stopping all at once (\"cold turkey\"). · You want to cut back slowly on the number of cigarettes you smoke. · You smoke fewer than 5 cigarettes a day. · You do not like using medicine. · You feel the side effects of medicines outweigh the benefits. · You are worried about the cost of medicines. Your decision  Thinking about the facts and your feelings can help you make a decision that is right for you.  Be sure you understand the benefits and risks of your options, and think about what else you need to do before you make the decision. Where can you learn more? Go to http://hunter-luanne.info/. Enter I940 in the search box to learn more about \"Deciding About Using Medicines To Quit Smoking. \"  Current as of: September 26, 2018  Content Version: 12.2  © 9168-9454 [employer]. Care instructions adapted under license by [employer] (which disclaims liability or warranty for this information). If you have questions about a medical condition or this instruction, always ask your healthcare professional. [place] [address] any warranty or liability for your use of this information. Patient Education        Learning About Benefits From Quitting Smoking  How does quitting smoking make you healthier? If you're thinking about quitting smoking, you may have a few reasons to be smoke-free. Your health may be one of them. · When you quit smoking, you lower your risks for cancer, lung disease, heart attack, stroke, blood vessel disease, and blindness from macular degeneration. · When you're smoke-free, you get sick less often, and you heal faster. You are less likely to get colds, flu, bronchitis, and pneumonia. · As a nonsmoker, you may find that your mood is better and you are less stressed. When and how will you feel healthier? Quitting has real health benefits that start from day 1 of being smoke-free. And the longer you stay smoke-free, the healthier you get and the better you feel. The first hours  · After just 20 minutes, your blood pressure and heart rate go down. That means there's less stress on your heart and blood vessels. · Within 12 hours, the level of carbon monoxide in your blood drops back to normal. That makes room for more oxygen. With more oxygen in your body, you may notice that you have more energy than when you smoked. After 2 weeks  · Your lungs start to work better.   · Your risk of heart attack starts to drop.  After 1 month  · When your lungs are clear, you cough less and breathe deeper, so it's easier to be active. · Your sense of taste and smell return. That means you can enjoy food more than you have since you started smoking. Over the years  · After 1 year, your risk of heart disease is half what it would be if you kept smoking. · After 5 years, your risk of stroke starts to shrink. Within a few years after that, it's about the same as if you'd never smoked. · After 10 years, your risk of dying from lung cancer is cut by about half. And your risk for many other types of cancer is lower too. How would quitting help others in your life? When you quit smoking, you improve the health of everyone who now breathes in your smoke. · Their heart, lung, and cancer risks drop, much like yours. · They are sick less. For babies and small children, living smoke-free means they're less likely to have ear infections, pneumonia, and bronchitis. · If you're a woman who is or will be pregnant someday, quitting smoking means a healthier . · Children who are close to you are less likely to become adult smokers. Where can you learn more? Go to http://hunterSpringbotluanne.info/. Enter [id number] [id number] [id number] in the search box to learn more about \"Learning About Benefits From Quitting Smoking. \"  Current as of: 2018  Content Version: 12.2  © 9719-0940 [employer]. Care instructions adapted under license by [employer] (which disclaims liability or warranty for this information). If you have questions about a medical condition or this instruction, always ask your healthcare professional. [place] [address] any warranty or liability for your use of this information. Patient Education        Stopping Smoking: Care Instructions  Your Care Instructions  Cigarette smokers crave the nicotine in cigarettes. Giving it up is much harder than simply changing a habit.  Your body has to stop craving the nicotine. It is hard to quit, but you can do it. There are many tools that people use to quit smoking. You may find that combining tools works best for you. There are several steps to quitting. First you get ready to quit. Then you get support to help you. After that, you learn new skills and behaviors to become a nonsmoker. For many people, a necessary step is getting and using medicine. Your doctor will help you set up the plan that best meets your needs. You may want to attend a smoking cessation program to help you quit smoking. When you choose a program, look for one that has proven success. Ask your doctor for ideas. You will greatly increase your chances of success if you take medicine as well as get counseling or join a cessation program.  Some of the changes you feel when you first quit tobacco are uncomfortable. Your body will miss the nicotine at first, and you may feel short-tempered and grumpy. You may have trouble sleeping or concentrating. Medicine can help you deal with these symptoms. You may struggle with changing your smoking habits and rituals. The last step is the tricky one: Be prepared for the smoking urge to continue for a time. This is a lot to deal with, but keep at it. You will feel better. Follow-up care is a key part of your treatment and safety. Be sure to make and go to all appointments, and call your doctor if you are having problems. It's also a good idea to know your test results and keep a list of the medicines you take. How can you care for yourself at home? · Ask your family, friends, and coworkers for support. You have a better chance of quitting if you have help and support. · Join a support group, such as Nicotine Anonymous, for people who are trying to quit smoking. · Consider signing up for a smoking cessation program, such as the American Lung Association's Freedom from Smoking program.  · Get text messaging support.  Go to the website at www.smokefree. gov to sign up for the [facility] program.  · Set a quit date. Pick your date carefully so that it is not right in the middle of a big deadline or stressful time. Once you quit, do not even take a puff. Get rid of all ashtrays and lighters after your last cigarette. Clean your house and your clothes so that they do not smell of smoke. · Learn how to be a nonsmoker. Think about ways you can avoid those things that make you reach for a cigarette. ? Avoid situations that put you at greatest risk for smoking. For some people, it is hard to have a drink with friends without smoking. For others, they might skip a coffee break with coworkers who smoke. ? Change your daily routine. Take a different route to work or eat a meal in a different place. · Cut down on stress. Calm yourself or release tension by doing an activity you enjoy, such as reading a book, taking a hot bath, or gardening. · Talk to your doctor or pharmacist about nicotine replacement therapy, which replaces the nicotine in your body. You still get nicotine but you do not use tobacco. Nicotine replacement products help you slowly reduce the amount of nicotine you need. These products come in several forms, many of them available over-the-counter:  ? Nicotine patches  ? Nicotine gum and lozenges  ? Nicotine inhaler  · Ask your doctor about bupropion (Wellbutrin) or varenicline (Chantix), which are prescription medicines. They do not contain nicotine. They help you by reducing withdrawal symptoms, such as stress and anxiety. · Some people find hypnosis, acupuncture, and massage helpful for ending the smoking habit. · Eat a healthy diet and get regular exercise. Having healthy habits will help your body move past its craving for nicotine. · Be prepared to keep trying. Most people are not successful the first few times they try to quit. Do not get mad at yourself if you smoke again.  Make a list of things you learned and think about when you want to try again, such as next week, next month, or next year. Where can you learn more? Go to http://hunter-luanne.info/. Enter E331 in the search box to learn more about \"Stopping Smoking: Care Instructions. \"  Current as of: September 26, 2018  Content Version: 12.2  © 5129-0791 [employer]. Care instructions adapted under license by [employer] (which disclaims liability or warranty for this information). If you have questions about a medical condition or this instruction, always ask your healthcare professional. [place] [address] any warranty or liability for your use of this information. Patient Education        Stroke: Care Instructions  Your Care Instructions    You have had a stroke. This means that the blood flow to a part of your brain was blocked for some time, which damages the nerve cells in that part of the brain. The part of your body controlled by that part of your brain may not function properly now. The brain is an amazing organ that can heal itself to some degree. The stroke you had damaged part of your brain. But other parts of your brain may take over in some way for the damaged areas. You have already started this process. Your doctor will talk with you about what you can do to prevent another stroke. High blood pressure, high cholesterol, and diabetes are all risk factors for stroke. If you have any of these conditions, work with your doctor to make sure they are under control. Other risk factors for stroke include being overweight, smoking, and not getting regular exercise. Going home may be hard for you and your family. The more you can try to do for yourself, the better. Remember to take each day one at a time. Follow-up care is a key part of your treatment and safety. Be sure to make and go to all appointments, and call your doctor if you are having problems.  It's also a good idea to know your test results and keep a list of the medicines you take. How can you care for yourself at home?    · Enter a stroke rehabilitation (rehab) program, if your doctor recommends it. Physical, speech, and occupational therapies can help you manage bathing, dressing, eating, and other basics of daily living.     · Do not drive until your doctor says it is okay.     · It is normal to feel sad or depressed after a stroke. If these feelings last, talk to your doctor.     · If you are having problems with urine leakage, go to the bathroom at regular times, including when you first wake up and at bedtime. Also, limit fluids after dinner.     · If you are constipated, drink plenty of fluids, enough so that your urine is light yellow or clear like water. If you have kidney, heart, or liver disease and have to limit fluids, talk with your doctor before you increase the amount of fluids you drink. Set up a regular time for using the toilet. If you continue to have constipation, your doctor may suggest using a bulking agent, such as Metamucil, or a stool softener, laxative, or enema. Medicines    · Take your medicines exactly as prescribed. Call your doctor if you think you are having a problem with your medicine. You may be taking several medicines. ACE (angiotensin-converting enzyme) inhibitors, angiotensin II receptor blockers (ARBs), beta-blockers, diuretics (water pills), and calcium channel blockers control your blood pressure. Statins help lower cholesterol. Your doctor may also prescribe medicines for depression, pain, sleep problems, anxiety, or agitation.     · If your doctor has given you a blood thinner to prevent another stroke, be sure you get instructions about how to take your medicine safely.  Blood thinners can cause serious bleeding problems.     · Do not take any over-the-counter medicines or herbal products without talking to your doctor first.     · If you take birth control pills or hormone therapy, talk to your doctor about whether they are right for you.    For family members and caregivers    · Make the home safe. Set up a room so that your loved one does not have to climb stairs. Be sure the bathroom is on the same floor. Move throw rugs and furniture that could cause falls. Make sure that the lighting is good. Put grab bars and seats in tubs and showers.     · Find out what your loved one can do and what he or she needs help with. Try not to do things for your loved one that your loved one can do on his or her own. Help him or her learn and practice new skills.     · Visit and talk with your loved one often. Try doing activities together that you both enjoy, such as playing cards or board games. Keep in touch with your loved one's friends as much as you can. Encourage them to visit.     · Take care of yourself. Do not try to do everything yourself. Ask other family members to help. Eat well, get enough rest, and take time to do things that you enjoy. Keep up with your own doctor visits, and make sure to take your medicines regularly. Get out of the house as much as you can. Join a local support group. Find out if you qualify for home health care visits to help with rehab or for adult day care. When should you call for help? Call 911 anytime you think you may need emergency care. For example, call if:    · You have signs of another stroke. These may include:  ? Sudden numbness, tingling, weakness, or loss of movement in your face, arm, or leg, especially on only one side of your body. ? Sudden vision changes. ? Sudden trouble speaking. ? Sudden confusion or trouble understanding simple statements. ? Sudden problems with walking or balance. ? A sudden, severe headache that is different from past headaches. Call 911 even if these symptoms go away in a few minutes.    Call your doctor now or seek immediate medical care if:    · You have new symptoms that may be related to your stroke, such as falls or trouble swallowing.  Watch closely for changes in your health, and be sure to contact your doctor if you have any problems. Where can you learn more? Go to http://hunter-luanne.info/. Enter I623 in the search box to learn more about \"Stroke: Care Instructions. \"  Current as of: September 26, 2018  Content Version: 12.2  © 4221-0770 [employer]. Care instructions adapted under license by [employer] (which disclaims liability or warranty for this information). If you have questions about a medical condition or this instruction, always ask your healthcare professional. [place] [address] any warranty or liability for your use of this information. Patient Education        Statins: Care Instructions  Your Care Instructions    Statins are medicines that lower your cholesterol and your risk for a heart attack and stroke. Cholesterol is a type of fat in your blood. If you have too much cholesterol, it can build up in blood vessels. This raises your risk of heart disease, heart attack, and stroke. Statins lower cholesterol by blocking how much your body makes. This prevents cholesterol from building up in your blood vessels. This is called hardening of the arteries. It is the starting point for some heart and blood flow problems, such as heart disease. Statins may also reduce inflammation around the buildup (called plaque). This can lower the risk that the plaque will break apart and lead to a heart attack or stroke. A heart-healthy lifestyle is important for lowering your risk whether you take statins or not. This includes eating healthy foods, being active, staying at a healthy weight, and not smoking. You must take statins regularly for them to work well. If you stop, your cholesterol and your risk will go back up. Examples of statins include:  · Atorvastatin (Lipitor). · Lovastatin (Mevacor). · Pravastatin (Pravachol). · Simvastatin (Zocor).   Statins interact with many medicines. So tell your doctor all of the other medicines that you take. These include prescription medicines, over-the-counter medicines, dietary supplements, and herbal products. Follow-up care is a key part of your treatment and safety. Be sure to make and go to all appointments, and call your doctor if you are having problems. It's also a good idea to know your test results and keep a list of the medicines you take. How can you care for yourself at home? · Take statins exactly as your doctor tells you. High cholesterol has no symptoms. So it is easy to forget to take the pills. Try to make a system that reminds you to take them. · Do not take two or more medicines at the same time unless the doctor told you to. Statins can interact with other medicines. · Always tell your doctor if you think you are having a side effect. If side effects are a problem with one medicine, a different one may be used. · Keep making the lifestyle changes your doctor suggests. Eat heart-healthy foods, be active, don't smoke, and stay at a healthy weight. · Talk to your doctor about avoiding grapefruit juice if you take statins. Grapefruit juice can raise the level of this medicine in your blood. This could increase side effects. When should you call for help? Watch closely for changes in your health, and be sure to contact your doctor if:    · You think you are having problems with your medicine.     · You have aches or muscle pain. Where can you learn more? Go to http://hunter-luanne.info/. Enter R358 in the search box to learn more about \"Statins: Care Instructions. \"  Current as of: April 9, 2019  Content Version: 12.2  © 3291-9120 [employer]. Care instructions adapted under license by [employer] (which disclaims liability or warranty for this information).  If you have questions about a medical condition or this instruction, always ask your healthcare professional. [facility] disclaims any warranty or liability for your use of this information. Patient Education        Learning About Risk Factors for Stroke  What puts you at risk for having a stroke? Your chances of having a stroke depend on your risk factors. Some risks can be lowered by medicines and lifestyle changes. Others can't. This list includes some of the risk factors for having a stroke. You and your doctor or nurse can use it to discuss your risk and how to lower it. By making a plan to lower your risk, you can give yourself some control and peace of mind. Risk factors you can control with medicines and lifestyle changes  High blood pressure. It pushes blood through the arteries with too much force. Over time, this damages the walls of the arteries. Atherosclerosis   This problem is also called hardening of the arteries. It happens when fatty deposits build up inside arteries. Diabetes. This means that there's a problem in your body that causes sugar to stay in your blood. You end up with high blood sugar. Over time, high blood sugar can lead to hardening of the arteries. High cholesterol. This can lead to the buildup of plaque in artery walls. Atrial fibrillation. This is also known as an irregular heartbeat. It increases the risk of blood clots that could cause a stroke. Risk factors you can control with lifestyle changes  Smoking. Smoking, or even inhaling secondhand smoke, increases your risk of heart attack and stroke. Being overweight. Being overweight makes it more likely you will develop high blood pressure, heart problems, and diabetes. These conditions make a stroke more likely. Drinking too much alcohol. This means more than 2 drinks a day for men and 1 drink a day for women. Not getting enough physical activity. If you aren't active, you have a higher risk of health conditions that make a stroke more likely. Risk factors you can't control  Age.   The risk of stroke goes up as you get older. Being female. Women have a higher risk of stroke than men. Race.  Americans, Native Americans, and [place] Natives have a higher risk than those of other races. Family history of stroke. Your chances of having a stroke are higher if other people in your family have had one. Previous stroke or TIA. After you've had a stroke, you're at risk for another one. Where can you learn more? Go to http://hunter-luanne.info/. Enter R220 in the search box to learn more about \"Learning About Risk Factors for Stroke. \"  Current as of: September 26, 2018  Content Version: 12.2  © 2864-9120 [employer]. Care instructions adapted under license by [employer] (which disclaims liability or warranty for this information). If you have questions about a medical condition or this instruction, always ask your healthcare professional. [place] [address] any warranty or liability for your use of this information. Patient Education        Learning About FAST: Stroke Warning Signs  What is FAST? FAST is a simple way to remember the main symptoms of stroke. Recognizing these symptoms helps you know when to call for medical help. FAST stands for:  · Face drooping. · Arm weakness. · Speech difficulty. · Time to call 911. What happens when you have a stroke? A stroke occurs when a blood vessel to the brain bursts or is blocked by a blood clot. Within minutes, the nerve cells in that part of the brain die. As a result, the part of the body controlled by those cells cannot work properly. The effects of a stroke may range from mild to severe. They may get better, or they may last the rest of your life. A stroke can affect vision, speech, behavior, thought processes, and your ability to move.   It can cause symptoms that may include:  · Sudden numbness, tingling, weakness, or loss of movement in your face, arm, or leg, especially on only one side of your body. · Sudden vision changes. · Sudden trouble speaking. · Sudden confusion or trouble understanding simple statements. · Sudden problems with walking or balance. · A sudden, severe headache that is different from past headaches. Why is it important to get help FAST? Quick treatment may save your life. And it may reduce the damage in your brain so that you have fewer problems after the stroke. When you know the FAST symptoms, you will know when it's important to call for medical help. Where can you learn more? Go to http://hunter-luanne.info/. Enter M750 in the search box to learn more about \"Learning About FAST: Stroke Warning Signs. \"  Current as of: September 26, 2018  Content Version: 12.2  © 7094-9869 [employer]. Care instructions adapted under license by [employer] (which disclaims liability or warranty for this information). If you have questions about a medical condition or this instruction, always ask your healthcare professional. [place] [address] any warranty or liability for your use of this information. DISCHARGE SUMMARY from Nurse    PATIENT INSTRUCTIONS:    After general anesthesia or intravenous sedation, for 24 hours or while taking prescription Narcotics:  · Limit your activities  · Do not drive and operate hazardous machinery  · Do not make important personal or business decisions  · Do  not drink alcoholic beverages  · If you have not urinated within 8 hours after discharge, please contact your surgeon on call.     Report the following to your surgeon:  · Excessive pain, swelling, redness or odor of or around the surgical area  · Temperature over 100.5  · Nausea and vomiting lasting longer than 4 hours or if unable to take medications  · Any signs of decreased circulation or nerve impairment to extremity: change in color, persistent  numbness, tingling, coldness or increase pain  · Any questions    What to do at Home:  Recommended activity: Activity as tolerated     If you experience any of the following symptoms chest pain, shortness of breath or any concerning symptoms, please follow up with primary care provider or emergency department. *  Please give a list of your current medications to your Primary Care Provider. *  Please update this list whenever your medications are discontinued, doses are      changed, or new medications (including over-the-counter products) are added. *  Please carry medication information at all times in case of emergency situations. These are general instructions for a healthy lifestyle:    No smoking/ No tobacco products/ Avoid exposure to second hand smoke  Surgeon General's Warning:  Quitting smoking now greatly reduces serious risk to your health. Obesity, smoking, and sedentary lifestyle greatly increases your risk for illness    A healthy diet, regular physical exercise & weight monitoring are important for maintaining a healthy lifestyle    You may be retaining fluid if you have a history of heart failure or if you experience any of the following symptoms:  Weight gain of 3 pounds or more overnight or 5 pounds in a week, increased swelling in our hands or feet or shortness of breath while lying flat in bed. Please call your doctor as soon as you notice any of these symptoms; do not wait until your next office visit. The discharge information has been reviewed with the patient. The patient verbalized understanding. Discharge medications reviewed with the patient and appropriate educational materials and side effects teaching were provided.   ___________________________________________________________________________________________________________________________________

## 2019-12-09 NOTE — PROGRESS NOTES
Indigent med prescriptions faxed to outpatient pharmacy. Indigent cane given to pt      Reason for Admission:  CVA (cerebral vascular accident) ([facility] [address] [I63.9]                 RRAT Score:    6            Plan for utilizing home health:    No                       Likelihood of Readmission:   LOW                         Transition of Care Plan:              Initial assessment completed with patient. Cognitive status of patient: oriented to time, place, person and situation. Face sheet information confirmed:  yes. The patient participate in his discharge plan and to receive any needed information. This patient lives in a  home with son and uncle. Patient is able to navigate steps as needed. Prior to hospitalization, patient was considered to be independent with ADLs/IADLS : yes . Patient has a current ACP document on file: no  The family memberwill be available to transport patient home upon discharge. The patient already has no medical equipment available in the home. Patient is not currently active with home health. Patient has not stayed in a skilled nursing facility or rehab. Was  stay within last 60 days : no. This patient is on dialysis :no    Currently, the discharge plan is Home. The patient states that he can obtain his medications from the pharmacy, and take his medications as directed. Patient's current insurance is none. Notified [place] of [employer] to screen pt for Medicaid. Gave pt information for [facility] Management Interventions  PCP Verified by CM: No  Palliative Care Criteria Met (RRAT>21 & CHF Dx)?: No  Mode of Transport at Discharge: Other (see comment)(family)  Transition of Care Consult (CM Consult): Discharge Planning  Physical Therapy Consult: Yes  Occupational Therapy Consult: Yes  Current Support Network:  Other(pt lives with his son and uncle)  Confirm Follow Up Transport: Self  Plan discussed with Pt/Family/Caregiver: Yes  Discharge Location  Discharge Placement: Home        JONG Crews RN  Care Management  Pager: [phone]

## 2019-12-09 NOTE — ROUTINE PROCESS
Primary Nurse Lauren Padron RN and MORIS Dugan RN performed a dual skin assessment on this patient No impairment noted Alex score is 19

## 2019-12-09 NOTE — ED NOTES
7:10 PM  Call taken for Dr. Cathy Lee from Dr. Damian Urbina, radiologist.   Negative CT, no acute process

## 2019-12-09 NOTE — PROGRESS NOTES
Problem: Falls - Risk of  Goal: *Absence of Falls  Description  Document Aishwarya Lu Fall Risk and appropriate interventions in the flowsheet.   Outcome: Progressing Towards Goal  Note: Fall Risk Interventions:  Mobility Interventions: Bed/chair exit alarm, Assess mobility with egress test, PT Consult for mobility concerns                             Problem: Patient Education: Go to Patient Education Activity  Goal: Patient/Family Education  Outcome: Progressing Towards Goal     Problem: Pain  Goal: *Control of Pain  Outcome: Progressing Towards Goal     Problem: Injury - Risk of, Adverse Drug Event  Goal: *Absence of medication errors  Outcome: Progressing Towards Goal     Problem: Patient Education: Go to Patient Education Activity  Goal: Patient/Family Education  Outcome: Progressing Towards Goal     Problem: TIA/CVA Stroke: 0-24 hours  Goal: Off Pathway (Use only if patient is Off Pathway)  Outcome: Progressing Towards Goal     Problem: Deep Venous Thrombosis - Risk of  Goal: *Absence of deep venous thrombosis signs and symptoms(Stroke Metric)  Outcome: Progressing Towards Goal     Problem: Hypertension  Goal: *Blood pressure within specified parameters  Outcome: Progressing Towards Goal

## 2019-12-09 NOTE — H&P
History & Physical    Patient: Gurdeep Mejia MRN: [id number]  CSN: [id number]    YOB: 1960  Age: 64 y.o. Sex: male      DOA: 12/8/2019       HPI:     Gurdeep Mejia is a 64 y.o. male with a history of hypertension and 9 years in recovery developed numbness right side on 12/8/2019 and  Presented to [facility] ER  In the ER he was a code S, CT with no acute findings and tele neuro was consulted. Clinically his symptoms persisted; was outside window for TPA. CTA's performed to assess for intervention and were reported as  Patent cerebral arteries. He was admitted to tele neurology on stroke protocol. Past Medical History:   Diagnosis Date    Hypertension        History reviewed. No pertinent surgical history. History reviewed. No pertinent family history. Social History     Socioeconomic History    Marital status: SINGLE     Spouse name: Not on file    Number of children: Not on file    Years of education: Not on file    Highest education level: Not on file   Tobacco Use    Smoking status: Current Every Day Smoker     Packs/day: 1.00     Years: 1.00     Pack years: 1.00    Smokeless tobacco: Never Used   Substance and Sexual Activity    Alcohol use: No       Prior to Admission medications    Medication Sig Start Date End Date Taking? Authorizing Provider   lisinopril (PRINIVIL, ZESTRIL) 10 mg tablet Take  by mouth daily. Mango Llanes MD   amLODIPine (NORVASC) 10 mg tablet Take  by mouth daily. Mango Llanes MD   cloNIDine (CATAPRES) 0.1 mg tablet Take  by mouth two (2) times a day. Mango Llanes MD   oxyCODONE-acetaminophen (PERCOCET) 5-325 mg per tablet Take 1 Tab by mouth every four (4) hours as needed for Pain. 6/25/14   Esther Other, NP       Allergies   Allergen Reactions    Motrin [Ibuprofen] Hives     . Review of Systems  A 12 point review of systems was performed and is unremarkable except   as detailed in HPI.           Physical Exam:      Visit Vitals  BP (!) 170/94 (BP 1 Location: Left arm)   Pulse (!) 58   Temp 97.6 °F (36.4 °C)   Resp 18   Ht 5' 11\" (1.803 m)   Wt 81.6 kg (180 lb)   SpO2 100%   BMI 25.10 kg/m²       Physical Exam:  GENERAL: fatigued, cooperative, mild distress  HEENT head is atraumatic, conjunctiva clear, anicteric, facial asymmetry with nasolabial fold on the right decreased; neck supple  Chest heart rate bradycardic regular 58, lungs clear  Abdomen soft, nontender, bowel sounds positive  Extremities right-sided limb ataxia, right pronator drift, subjective right-sided numbness, calves are soft, no edema positive pulses    Lab/Data Review:  Labs: Results:       Chemistry Recent Labs     12/08/19 1909   GLU 92      K 3.6      CO2 28   BUN 13   CREA 1.00   CA 9.3   AGAP 6   BUCR 13      CBC w/Diff Recent Labs     12/08/19 1909   WBC 9.2   RBC 4.62*   HGB 14.4   HCT 41.3      GRANS 58   LYMPH 31   EOS 3      Coagulation No results for input(s): PTP, INR, APTT, INREXT in the last 72 hours. Iron/Ferritin No results for input(s): IRON in the last 72 hours. No lab exists for component: TIBCCALC   BNP No results for input(s): BNPP in the last 72 hours. Cardiac Enzymes Recent Labs     12/08/19 1909      CKND1 0.9      Liver Enzymes No results for input(s): TP, ALB, TBIL, AP, SGOT, GPT in the last 72 hours. No lab exists for component: DBIL   Thyroid Studies No results found for: T4, T3U, TSH, TSHEXT       All Micro Results     None          Imaging Reviewed:  CT Results (most recent):  Results from Hospital Encounter encounter on 12/08/19   CTA HEAD NECK W WO CONT    Narrative EXAM: CTA HEAD NECK W WO CONT    CLINICAL INDICATIONS: LVO ; presented with history of hypertension and  right-sided numbness upon awakening the day of presentation; stroke evaluation    TECHNIQUE: Helical CT scan of the brain and neck were performed at 1.25 mm  intervals during rapid IV bolus contrast administration.   These data were  reconstructed at .625 mm intervals for vascular analysis. The data was also  reviewed at 2.5 mm intervals for accompanying soft tissue analysis. 3D post  processed images, including surface shaded displays, were produced for this exam  on independent console, permanently archived and interpreted. All CT scans at this facility are performed using dose optimization technique as  appropriate to a performed exam, to include automated exposure control,  adjustment of the MA and/or kV according to patient size (including appropriate  matching for site-specific examinations) or use of  iterative reconstruction  technique. CONTRAST: 100 cc Isovue 370    COMPARISON: Current and remote head CT    CTA SOFT TISSUE ANALYSIS:  Lungs: Mild to moderate centrilobular and paraseptal emphysematous findings with  bulla  Upper chest: Unremarkable  Neck: Subcentimeter elliptical low-density focus of the right lateral facial  subdermal region, perhaps sebaceous cyst  Lymph nodes: No adenopathy  Orbits: Unremarkable  Paranasal sinuses: Clear  Brain: No hemorrhage or mass effect. Bones: No acute findings. Multilevel disc space narrowing and spondylosis. No  high-grade spinal stenosis, but there are varying degrees of mild to severe  foraminal stenoses from uncinate/facet arthropathy. CTA NECK VASCULAR ANALYSIS:     Aortic arch: Left sided with typical configuration. Innominate: Patent    Right Subclavian: Patent  Left Subclavian: Patent    Right carotid:  -CCA: Patent  -ECA: Patent  -ICA: Patent with mild atheroma. Estimated <10% stenosis of proximal ICA by  NASCET criteria. Left carotid:  -CCA: Patent. -ECA: Patent. -ICA: Patent with mild atheroma. Estimated <10% stenosis of proximal ICA by  NASCET criteria.     Right vertebral: Patent     Left vertebral: Patent      CTA BRAIN VASCULAR ANALYSIS:     Right anterior circulation:  -ICA: Patent  -TARAN: Patent   -MCA: Patent    Left anterior circulation:  -ICA: Patent  -TARAN: Mildly hypoplastic A1 segment, patent   -MCA: Patent    -ACOM: Unremarkable  -PCOM: No sizable vessels    Posterior circulation:  -RVA: Patent, dominant caliber  -LVA: Patent  -Basilar: Patent  -Right PCA: Patent  -Left PCA: Patent    Major dural venous sinuses: Unremarkable        Impression Impression:  1. Patent intra- and extracranial cerebral arteries. 2. Emphysema. 3. Concordant preliminary interpretation was submitted 12/8/2019 at 2005 hours  by Dr. Lenka Nelson. Thank you for enabling us to participate in the care of this patient.          Assessment:   Active Problems:    CVA (cerebral vascular accident) ([address] (12/8/2019)       Hypertension        Patient in Recovery for 9 years      Plan:     Admit to  tele neuroscience unit  Stroke protocol  [place] blood pressure  Fall precautions  Neurology has been consulted    Full code    Lizz Martel DO  12/8/2019, 11:51 PM

## 2019-12-09 NOTE — DISCHARGE SUMMARY
[facility]  [facility]       Patient: Eve Fragoso Age: 64 y.o. : 1960 MR#: [id number] SSN: [id number]  PCP on record: None  Admit date: 2019  Discharge date: 2019    Disposition:    []Home   []Home with Home Health   []SNF/NH   []Rehab   [x]Home with family   []Alternate Facility:____________________    Admission Diagnoses:  CVA (cerebral vascular accident) [facility]) [I63.9]    Discharge Diagnoses:                             Right sided numbness, POA   Small focus acute stroke mid posterior limb left internal capsule possibly involving a small portion of the adjacent left thalamus  Hypertension   Hyperlipidemia  Tobacco abuse     Discharge Medications:     Current Discharge Medication List      START taking these medications    Details   atorvastatin (LIPITOR) 80 mg tablet Take 1 Tab by mouth nightly. Qty: 30 Tab, Refills: 0      aspirin delayed-release 81 mg tablet Take 1 Tab by mouth daily for 30 days. Qty: 30 Tab, Refills: 0      clopidogrel (PLAVIX) 75 mg tab Take 1 Tab by mouth daily. Qty: 30 Tab, Refills: 0      nicotine (NICODERM CQ) 21 mg/24 hr 1 Patch by TransDERmal route daily for 30 days. Qty: 30 Patch, Refills: 0      polyethylene glycol (MIRALAX) 17 gram packet Take 1 Packet by mouth daily as needed for Other (constipation). Qty: 30 Packet, Refills: 0         CONTINUE these medications which have CHANGED    Details   amLODIPine (NORVASC) 10 mg tablet Take 1 Tab by mouth daily. Qty: 30 Tab, Refills: 0      lisinopril (PRINIVIL, ZESTRIL) 10 mg tablet Take 1 Tab by mouth daily.   Qty: 30 Tab, Refills: 0         STOP taking these medications       oxyCODONE-acetaminophen (PERCOCET) 5-325 mg per tablet Comments:   Reason for Stopping:             Consults:    - neurology     Procedures:  -   None    Significant Diagnostic Studies:   -  Mri Brain Wo Cont    Result Date: 2019  IMPRESSION: Small focus acute stroke mid posterior limb left internal capsule possibly involving a small portion of the adjacent left thalamus. No other ischemic change is seen and there is no intracranial hemorrhage. Minimal burden nonspecific white matter lesions, most likely chronic microvascular ischemic change. Brain otherwise unremarkable for age. Ct Head Wo Cont    Result Date: 12/8/2019  IMPRESSION: No acute intracranial findings. Motion artifact limits evaluation in the lower brain. Of note, MRI is more sensitive for detecting acute infarct. Findings called to WICHO Pratt at 1908 hours, 12/8/2019    Cta Head Neck W Wo Cont    Result Date: 12/9/2019  Impression: 1. Patent intra- and extracranial cerebral arteries. 2. Emphysema. 3. Concordant preliminary interpretation was submitted 12/8/2019 at 2005 hours by Dr. Tunde Faria. Thank you for enabling us to participate in the care of this patient. Xr Chest Port    Result Date: 12/9/2019  IMPRESSION: No acute pulmonic disease. ECHO ADULT COMPLETE - final read pending, discussed with cardiology Dr. Bin Jain prior to discharge. Preliminary with preserved EF and no wall motion abnormality. Hospital Course by Problem   1. Right sided numbness, POA - in setting of #2, improved at time of discharge. 2. Small focus acute stroke mid posterior limb left internal capsule possibly involving a small portion of the adjacent left thalamus - followed by neurology while inpatient with recommendation to continue asa 81mg and plavix along with high dose statin at time of discharge. Close outpatient neurology follow up requested   3. Hypertension - initially allowed for permissive hypertension in setting of acute CVA. Resume antihypertensives at time of discharge. Indigent supply x 1 month provided (patient reports only having 1 or 2 pills at home remaining). 4. Hyperlipidemia - LDL 77 during admission, continue high dose statin in setting of #2  5. Tobacco abuse - counseled on importance of cessation.   Agrees for trial of nicotine patch during admission and at time of discharge.       Today's examination of the patient revealed:     Subjective:     Objective:   VS:   Visit Vitals  BP (!) 159/94 (BP 1 Location: Left arm, BP Patient Position: At rest)   Pulse 63   Temp 97.2 °F (36.2 °C)   Resp 18   Ht 5' 11\" (1.803 m)   Wt 81.6 kg (180 lb)   SpO2 100%   BMI 25.10 kg/m²      Tmax/24hrs: Temp (24hrs), Av.1 °F (36.7 °C), Min:97.2 °F (36.2 °C), Max:98.9 °F (37.2 °C)     Input/Output:     Intake/Output Summary (Last 24 hours) at 2019 1524  Last data filed at 2019 0400  Gross per 24 hour   Intake 200 ml   Output    Net 200 ml     General:  Alert, NAD  Cardiovascular:  RRR  Pulmonary:  LSC throughout; respiratory effort WNL  GI:  +BS in all four quadrants, soft, non-tender  Extremities:  No edema; 2+ dorsalis pedis pulses bilaterally  Neuro: alert and oriented x 4; strength 5/5 throughout    Labs:    Recent Results (from the past 24 hour(s))   GLUCOSE, POC    Collection Time: 19  6:47 PM   Result Value Ref Range    Glucose (POC) 91 70 - 729 mg/dL   METABOLIC PANEL, BASIC    Collection Time: 19  7:09 PM   Result Value Ref Range    Sodium 141 136 - 145 mmol/L    Potassium 3.6 3.5 - 5.5 mmol/L    Chloride 107 100 - 111 mmol/L    CO2 28 21 - 32 mmol/L    Anion gap 6 3.0 - 18 mmol/L    Glucose 92 74 - 99 mg/dL    BUN 13 7.0 - 18 MG/DL    Creatinine 1.00 0.6 - 1.3 MG/DL    BUN/Creatinine ratio 13 12 - 20      GFR est AA >60 >60 ml/min/1.73m2    GFR est non-AA >60 >60 ml/min/1.73m2    Calcium 9.3 8.5 - 10.1 MG/DL   CBC WITH AUTOMATED DIFF    Collection Time: 19  7:09 PM   Result Value Ref Range    WBC 9.2 4.6 - 13.2 K/uL    RBC 4.62 (L) 4.70 - 5.50 M/uL    HGB 14.4 13.0 - 16.0 g/dL    HCT 41.3 36.0 - 48.0 %    MCV 89.4 74.0 - 97.0 FL    MCH 31.2 24.0 - 34.0 PG    MCHC 34.9 31.0 - 37.0 g/dL    RDW 13.3 11.6 - 14.5 %    PLATELET 013 264 - 339 K/uL    MPV 9.8 9.2 - 11.8 FL    NEUTROPHILS 58 40 - 73 % LYMPHOCYTES 31 21 - 52 %    MONOCYTES 8 3 - 10 %    EOSINOPHILS 3 0 - 5 %    BASOPHILS 0 0 - 2 %    ABS. NEUTROPHILS 5.4 1.8 - 8.0 K/UL    ABS. LYMPHOCYTES 2.8 0.9 - 3.6 K/UL    ABS. MONOCYTES 0.7 0.05 - 1.2 K/UL    ABS. EOSINOPHILS 0.3 0.0 - 0.4 K/UL    ABS.  BASOPHILS 0.0 0.0 - 0.1 K/UL    DF AUTOMATED     CARDIAC PANEL,(CK, CKMB & TROPONIN)    Collection Time: 12/08/19  7:09 PM   Result Value Ref Range     39 - 308 U/L    CK - MB 2.0 <3.6 ng/ml    CK-MB Index 0.9 0.0 - 4.0 %    Troponin-I, QT 0.04 0.0 - 0.045 NG/ML   POC CHEM8    Collection Time: 12/08/19  7:30 PM   Result Value Ref Range    CO2, POC 25 (H) 19 - 24 MMOL/L    Glucose, POC 94 74 - 106 MG/DL    BUN, POC 12 7 - 18 MG/DL    Creatinine, POC 1.0 0.6 - 1.3 MG/DL    GFRAA, POC >60 >60 ml/min/1.73m2    GFRNA, POC >60 >60 ml/min/1.73m2    Sodium,  136 - 145 MMOL/L    Potassium, POC 3.4 (L) 3.5 - 5.5 MMOL/L    Calcium, ionized (POC) 1.16 1.12 - 1.32 mmol/L    Chloride,  100 - 108 MMOL/L    Anion gap, POC 17 10 - 20      Hematocrit, POC 41 36 - 49 %    Hemoglobin, POC 13.9 12 - 16 G/DL   EKG, 12 LEAD, INITIAL    Collection Time: 12/08/19  8:07 PM   Result Value Ref Range    Ventricular Rate 68 BPM    Atrial Rate 68 BPM    P-R Interval 162 ms    QRS Duration 100 ms    Q-T Interval 394 ms    QTC Calculation (Bezet) 418 ms    Calculated P Axis 62 degrees    Calculated R Axis 1 degrees    Calculated T Axis 45 degrees    Diagnosis       Normal sinus rhythm  Normal ECG  When compared with ECG of 29-SEP-2010 06:09,  ST no longer depressed in Lateral leads  Nonspecific T wave abnormality no longer evident in Inferior leads  Nonspecific T wave abnormality no longer evident in Lateral leads  QT has shortened     DRUG SCREEN, URINE    Collection Time: 12/08/19  8:10 PM   Result Value Ref Range    BENZODIAZEPINES NEGATIVE  NEG      BARBITURATES NEGATIVE  NEG      THC (TH-CANNABINOL) NEGATIVE  NEG      OPIATES NEGATIVE  NEG      PCP(PHENCYCLIDINE) NEGATIVE  NEG      COCAINE NEGATIVE  NEG      AMPHETAMINES NEGATIVE  NEG      METHADONE NEGATIVE  NEG      HDSCOM (NOTE)    GLUCOSE, POC    Collection Time: 12/08/19 10:37 PM   Result Value Ref Range    Glucose (POC) 88 70 - 110 mg/dL   GLUCOSE, POC    Collection Time: 12/09/19  6:57 AM   Result Value Ref Range    Glucose (POC) 101 70 - 110 mg/dL   ECHO ADULT COMPLETE    Collection Time: 12/09/19 11:05 AM   Result Value Ref Range    LA Volume 39.27 18 - 58 mL    Ao Root D 3.39 cm    LVIDd 3.75 (A) 4.2 - 5.9 cm    LVPWd 1.32 (A) 0.6 - 1.0 cm    LVIDs 2.23 cm    IVSd 1.31 (A) 0.6 - 1.0 cm    LVOT d 2.02 cm    LVOT Peak Velocity 68.12 cm/s    LVOT Peak Gradient 1.9 mmHg    LVOT VTI 15.68 cm    MV A Ricardo 44.04 cm/s    MV E Ricardo 37.57 cm/s    MV E/A 0.90     RVIDd 4.31 cm    LA Vol 4C 24.09 18 - 58 mL    LA Vol 2C 55.32 18 - 58 mL    LA Area 4C 12.8 cm2    LV Mass .5 88 - 224 g    LV Mass AL Index 85.8 49 - 115 g/m2    Mitral Valve E Wave Deceleration Time 329.8 ms    Pulmonary Vein \"A\" Wave Velocity 14.60 cm/s    P Vein A Dur 120.0 ms    LA Vol Index 19.47 16 - 28 ml/m2    LA Vol Index 27.43 16 - 28 ml/m2    LA Vol Index 11.95 16 - 28 ml/m2    Left Ventricular Fractional Shortening by 2D 60.991259547 %    Left Ventricular Outflow Tract Mean Gradient 1.4494229324 mmHg    Left Ventricular Outflow Tract Mean Velocity 7.8076638609 cm/s    Mitral Valve Deceleration Baca 3.3792793825     Left Ventricular End Diastolic Volume by Teichholz Method 8.49306616462 mL    Left Ventricular End Systolic Volume by Teichholz Method 2.436984695872 mL    Left Ventricular Stroke Volume by Teichholz Method 93.726409388 mL   GLUCOSE, POC    Collection Time: 12/09/19 11:19 AM   Result Value Ref Range    Glucose (POC) 107 70 - 110 mg/dL   HEMOGLOBIN A1C WITH EAG    Collection Time: 12/09/19  1:05 PM   Result Value Ref Range    Hemoglobin A1c 5.5 4.2 - 5.6 %    Est. average glucose 111 mg/dL   LIPID PANEL    Collection Time: 12/09/19  1:05 PM   Result Value Ref Range    LIPID PROFILE          Cholesterol, total 143 <200 MG/DL    Triglyceride 100 <150 MG/DL    HDL Cholesterol 46 40 - 60 MG/DL    LDL, calculated 77 0 - 100 MG/DL    VLDL, calculated 20 MG/DL    CHOL/HDL Ratio 3.1 0 - 5.0     TSH 3RD GENERATION    Collection Time: 12/09/19  1:05 PM   Result Value Ref Range    TSH 4.79 (H) 0.36 - 3.74 uIU/mL   T4, FREE    Collection Time: 12/09/19  1:05 PM   Result Value Ref Range    T4, Free 1.0 0.7 - 1.5 NG/DL     Additional Data Reviewed:     Condition: Stable  Follow-up Appointments:   Dr. Keira Kelly in 1-2 weeks   PCP in 5-7 days    >30 minutes spent coordinating this discharge (review instructions/follow-up, prescriptions, preparing report for sign off)    Signed:  Carl Stein NP  12/9/2019  3:24 PM

## 2019-12-09 NOTE — ROUTINE PROCESS
As part of the discharge instructions, medications already given today were discussed with the patient. The next dose due of all ordered meds was highlighted as part of the medication discharge instructions. Discussed with the patient the importance of taking medications as directed, as well as the side effects and adverse reactions to medications ordered. Patient and this writer discussed medications, side effects, why he is taking them and when to take He understands the  will call with a primary care provider and follow up appointment He left with medications from the out patient pharmacy and a straight cane Wheel chair to chitra gregg site removed no issues

## 2019-12-09 NOTE — PROGRESS NOTES
PT eval order received and chart reviewed. Saw patient for eval and would recommend OP PT follow up and SPC. Full note to follow.  Thank you for this referral. Donna Atkinsonchlisbet, PT, DPT

## 2019-12-09 NOTE — PROGRESS NOTES
Problem: Falls - Risk of  Goal: *Absence of Falls  Description  Document Nicholas Roth Fall Risk and appropriate interventions in the flowsheet.   Outcome: Progressing Towards Goal  Note: Fall Risk Interventions:  Mobility Interventions: Bed/chair exit alarm                             Problem: Patient Education: Go to Patient Education Activity  Goal: Patient/Family Education  Outcome: Progressing Towards Goal     Problem: Pain  Goal: *Control of Pain  Outcome: Progressing Towards Goal     Problem: Patient Education: Go to Patient Education Activity  Goal: Patient/Family Education  Outcome: Progressing Towards Goal     Problem: Injury - Risk of, Adverse Drug Event  Goal: *Absence of adverse drug events  Outcome: Progressing Towards Goal  Goal: *Absence of medication errors  Outcome: Progressing Towards Goal  Goal: *Knowledge of prescribed medications  Outcome: Progressing Towards Goal     Problem: Patient Education: Go to Patient Education Activity  Goal: Patient/Family Education  Outcome: Progressing Towards Goal     Problem: Patient Education: Go to Patient Education Activity  Goal: Patient/Family Education  Outcome: Progressing Towards Goal     Problem: TIA/CVA Stroke: 0-24 hours  Goal: Off Pathway (Use only if patient is Off Pathway)  Outcome: Progressing Towards Goal  Goal: Activity/Safety  Outcome: Progressing Towards Goal  Goal: Consults, if ordered  Outcome: Progressing Towards Goal  Goal: Diagnostic Test/Procedures  Outcome: Progressing Towards Goal  Goal: Nutrition/Diet  Outcome: Progressing Towards Goal  Goal: Discharge Planning  Outcome: Progressing Towards Goal  Goal: Medications  Outcome: Progressing Towards Goal  Goal: Respiratory  Outcome: Progressing Towards Goal  Goal: Treatments/Interventions/Procedures  Outcome: Progressing Towards Goal  Goal: Minimize risk of bleeding post-thrombolytic infusion  Outcome: Progressing Towards Goal  Goal: Monitor for complications post-thrombolytic infusion  Outcome: Progressing Towards Goal  Goal: Psychosocial  Outcome: Progressing Towards Goal  Goal: *Hemodynamically stable  Outcome: Progressing Towards Goal  Goal: *Neurologically stable  Description  Absence of additional neurological deficits    Outcome: Progressing Towards Goal  Goal: *Verbalizes anxiety and depression are reduced or absent  Outcome: Progressing Towards Goal  Goal: *Absence of Signs of Aspiration on Current Diet  Outcome: Progressing Towards Goal  Goal: *Absence of deep venous thrombosis signs and symptoms(Stroke Metric)  Outcome: Progressing Towards Goal  Goal: *Ability to perform ADLs and demonstrates progressive mobility and function  Outcome: Progressing Towards Goal  Goal: *Stroke education started(Stroke Metric)  Outcome: Progressing Towards Goal  Goal: *Dysphagia screen performed(Stroke Metric)  Outcome: Progressing Towards Goal  Goal: *Rehab consulted(Stroke Metric)  Outcome: Progressing Towards Goal     Problem: TIA/CVA Stroke: Day 2 Until Discharge  Goal: Off Pathway (Use only if patient is Off Pathway)  Outcome: Progressing Towards Goal  Goal: Activity/Safety  Outcome: Progressing Towards Goal  Goal: Diagnostic Test/Procedures  Outcome: Progressing Towards Goal  Goal: Nutrition/Diet  Outcome: Progressing Towards Goal  Goal: Discharge Planning  Outcome: Progressing Towards Goal  Goal: Medications  Outcome: Progressing Towards Goal  Goal: Respiratory  Outcome: Progressing Towards Goal  Goal: Treatments/Interventions/Procedures  Outcome: Progressing Towards Goal  Goal: Psychosocial  Outcome: Progressing Towards Goal  Goal: *Verbalizes anxiety and depression are reduced or absent  Outcome: Progressing Towards Goal  Goal: *Absence of aspiration  Outcome: Progressing Towards Goal  Goal: *Absence of deep venous thrombosis signs and symptoms(Stroke Metric)  Outcome: Progressing Towards Goal  Goal: *Optimal pain control at patient's stated goal  Outcome: Progressing Towards Goal  Goal: *Tolerating diet  Outcome: Progressing Towards Goal  Goal: *Ability to perform ADLs and demonstrates progressive mobility and function  Outcome: Progressing Towards Goal  Goal: *Stroke education continued(Stroke Metric)  Outcome: Progressing Towards Goal     Problem: Ischemic Stroke: Discharge Outcomes  Goal: *Verbalizes anxiety and depression are reduced or absent  Outcome: Progressing Towards Goal  Goal: *Verbalize understanding of risk factor modification(Stroke Metric)  Outcome: Progressing Towards Goal  Goal: *Hemodynamically stable  Outcome: Progressing Towards Goal  Goal: *Absence of aspiration pneumonia  Outcome: Progressing Towards Goal  Goal: *Aware of needed dietary changes  Outcome: Progressing Towards Goal  Goal: *Verbalize understanding of prescribed medications including anti-coagulants, anti-lipid, and/or anti-platelets(Stroke Metric)  Outcome: Progressing Towards Goal  Goal: *Tolerating diet  Outcome: Progressing Towards Goal  Goal: *Aware of follow-up diagnostics related to anticoagulants  Outcome: Progressing Towards Goal  Goal: *Ability to perform ADLs and demonstrates progressive mobility and function  Outcome: Progressing Towards Goal  Goal: *Absence of DVT(Stroke Metric)  Outcome: Progressing Towards Goal  Goal: *Absence of aspiration  Outcome: Progressing Towards Goal  Goal: *Optimal pain control at patient's stated goal  Outcome: Progressing Towards Goal  Goal: *Home safety concerns addressed  Outcome: Progressing Towards Goal  Goal: *Describes available resources and support systems  Outcome: Progressing Towards Goal  Goal: *Verbalizes understanding of activation of EMS(911) for stroke symptoms(Stroke Metric)  Outcome: Progressing Towards Goal  Goal: *Understands and describes signs and symptoms to report to providers(Stroke Metric)  Outcome: Progressing Towards Goal  Goal: *Neurolgocially stable (absence of additional neurological deficits)  Outcome: Progressing Towards Goal  Goal: *Verbalizes importance of follow-up with primary care physician(Stroke Metric)  Outcome: Progressing Towards Goal  Goal: *Smoking cessation discussed,if applicable(Stroke Metric)  Outcome: Progressing Towards Goal  Goal: *Depression screening completed(Stroke Metric)  Outcome: Progressing Towards Goal

## 2019-12-09 NOTE — ROUTINE PROCESS
Pt smoked 1pack/day for his entire life. Smoking cessation given. Will ask MD for nicotine patch. Pt does not have a primary doctor and admits to \"not keeping up as he should. \" Heparin given for DVT prevention. Will place rhianna hose per orders. Pt has home life situations to attend regarding being the only  of a minor child and is adamant about being discharged to be able to care for his son tonight. Pt is unable to sign properly using his R hand as he complains of numbness and tingling on his entire R side. Stroke book has been signed and pt has been educated. Will keep stroke book in chart until labs are resulted. Stroke Education provided to patient and the following topics were discussed MRI form faxed 1. Patients personal risk factors for stroke are hypertension and smoking 2. Warning signs of Stroke: * Sudden numbness or weakness of the face, arm or leg, especially on one side of The body * Sudden confusion, trouble speaking or understanding * Sudden trouble seeing in one or both eyes * Sudden trouble walking, dizziness, loss of balance or coordination * Sudden severe headache with no known cause 3. Importance of activation Emergency Medical Services ( 9-1-1 ) immediately if experience any warning signs of stroke. 4. Be sure and schedule a follow-up appointment with your primary care doctor or any specialists as instructed. 5. You must take medicine every day to treat your risk factors for stroke. Be sure to take your medicines exactly as your doctor tells you: no more, no less. Know what your medicines are for , what they do. Anti-thrombotics /anticoagulants can help prevent strokes. You are taking the following medicine(s)  see MAR 6. Smoking and second-hand smoke greatly increase your risk of stroke, cardiovascular disease and death. Smoking history \"my entire life\" 7. Information provided was [facility] Stroke Education Binder, Stroke Handouts or Verbal Education 8. Documentation of teaching completed in Patient Education Activity and on Care Plan with teaching response noted? yes

## 2019-12-09 NOTE — ROUTINE PROCESS
Bedside and Verbal shift change report given to RNEÉ Caban RN  (oncoming nurse) by Diana Hernández RN 
(offgoing nurse). Report included the following information SBAR, MAR and Recent Results.

## 2019-12-12 NOTE — CDMP QUERY
Patient admitted with  small CVA. pt was noted to have right sided numbness. If possible, please document in progress notes and d/c summary if you are evaluating and/or treating any of the following: 
 
 
 right sided hemiparesis 
            right sided numbness only Other Explanation of clinical findings Clinically Undetermined (no explanation for clinical findings) The medical record reflects the following: 
 
   Risk Factors: new  stroke Clinical Indicators: poer admit notes \"  of right side numbness/tingling \" 
 
per neuro consult- \"    admitted yesterday with a CC of right sided weakness and numbness he woke up with. \"  
 
\"  S/P left thalamocapsular syndrome with right HP and heminumbness, partially resolved \" Treatment: neuro consult, PT, OT Thank you,   Jayro Julian RN  CCDS  x [phone]

## 2019-12-17 ENCOUNTER — OFFICE VISIT (OUTPATIENT)
Dept: NEUROLOGY | Age: 59
End: 2019-12-17

## 2019-12-17 VITALS
SYSTOLIC BLOOD PRESSURE: 136 MMHG | RESPIRATION RATE: 20 BRPM | HEIGHT: 71 IN | TEMPERATURE: 98.7 F | HEART RATE: 78 BPM | OXYGEN SATURATION: 98 % | WEIGHT: 178 LBS | DIASTOLIC BLOOD PRESSURE: 72 MMHG | BODY MASS INDEX: 24.92 KG/M2

## 2019-12-17 DIAGNOSIS — I63.512 CEREBROVASCULAR ACCIDENT (CVA) DUE TO OCCLUSION OF LEFT MIDDLE CEREBRAL ARTERY (HCC): Primary | ICD-10-CM

## 2019-12-17 RX ORDER — ASPIRIN 81 MG/1
81 TABLET ORAL DAILY
Qty: 30 TAB | Refills: 2 | Status: SHIPPED | OUTPATIENT
Start: 2019-12-17 | End: 2020-01-16

## 2019-12-17 RX ORDER — CLOPIDOGREL BISULFATE 75 MG/1
75 TABLET ORAL DAILY
Qty: 30 TAB | Refills: 2 | Status: SHIPPED | OUTPATIENT
Start: 2019-12-17 | End: 2020-01-27 | Stop reason: SDUPTHER

## 2019-12-17 RX ORDER — AMLODIPINE BESYLATE 10 MG/1
10 TABLET ORAL DAILY
Qty: 30 TAB | Refills: 2 | Status: SHIPPED | OUTPATIENT
Start: 2019-12-17 | End: 2020-01-27 | Stop reason: SDUPTHER

## 2019-12-17 RX ORDER — LISINOPRIL 10 MG/1
10 TABLET ORAL DAILY
Qty: 30 TAB | Refills: 2 | Status: SHIPPED | OUTPATIENT
Start: 2019-12-17 | End: 2020-01-27 | Stop reason: SDUPTHER

## 2019-12-17 RX ORDER — IBUPROFEN 200 MG
1 TABLET ORAL DAILY
Qty: 30 PATCH | Refills: 0 | Status: SHIPPED | OUTPATIENT
Start: 2019-12-17 | End: 2020-01-16

## 2019-12-17 RX ORDER — ATORVASTATIN CALCIUM 80 MG/1
80 TABLET, FILM COATED ORAL
Qty: 30 TAB | Refills: 2 | Status: SHIPPED | OUTPATIENT
Start: 2019-12-17 | End: 2020-01-27 | Stop reason: SDUPTHER

## 2019-12-17 NOTE — PROGRESS NOTES
Re:  Kim Bliss up visit     12/17/2019 12:28 PM    SSN: [id number]    Subjective:   Emanuel Santos is seen in follow up after his left CVA back 9 days ago. He's made significant recovery but still with some right sided weakness. Medications:    Current Outpatient Medications   Medication Sig Dispense Refill    atorvastatin (LIPITOR) 80 mg tablet Take 1 Tab by mouth nightly. 30 Tab 0    aspirin delayed-release 81 mg tablet Take 1 Tab by mouth daily for 30 days. 30 Tab 0    clopidogrel (PLAVIX) 75 mg tab Take 1 Tab by mouth daily. 30 Tab 0    amLODIPine (NORVASC) 10 mg tablet Take 1 Tab by mouth daily. 30 Tab 0    lisinopril (PRINIVIL, ZESTRIL) 10 mg tablet Take 1 Tab by mouth daily. 30 Tab 0    nicotine (NICODERM CQ) 21 mg/24 hr 1 Patch by TransDERmal route daily for 30 days. 30 Patch 0    polyethylene glycol (MIRALAX) 17 gram packet Take 1 Packet by mouth daily as needed for Other (constipation). 30 Packet 0    OTHER This is to certify that Trevon Cheek was admitted to [facility] from 12/08/2019 to 12/09/2019. He may may return to work on 12/16/2019. Please feel free to contact my office if you have any questions or concerns. Thank you.  1 Each 0       Vital signs:    Visit Vitals  /72 (BP 1 Location: Left arm, BP Patient Position: Sitting)   Pulse 78   Temp 98.7 °F (37.1 °C) (Oral)   Resp 20   Ht 5' 11\" (1.803 m)   Wt 80.7 kg (178 lb)   SpO2 98%   BMI 24.83 kg/m²       Review of Systems:   As above otherwise 11 point review of systems negative including;   Constitutional no fever or chills  Skin denies rash or itching  HEENT  Denies tinnitus, hearing lose  Eyes denies diplopia vision lose  Respiratory denies sortness of breath  Cardiovascular denies chest pain, dyspnea on exertion  Gastrointestinal denies nausea, vomiting, diarrhea, constipation  Genitourinary denies incontinence  Musculoskeletal denies joint pain or swelling  Endocrine denies weight change  Hematology denies easy bruising or bleeding   Neurological as above in HPI      Patient Active Problem List   Diagnosis Code    [facility]    Essential hypertension I10    Male erectile disorder N52.9    Tobacco abuse Z72.0         Objective: The patient is awake, alert, and oriented x 4. Fund of knowledge is adequate. Speech is fluent and memory is intact. Cranial Nerves: II - Visual fields are full to confrontation. III, IV, VI - Extraocular movements are intact. There is no nystagmus. V - Facial sensation is intact to pinprick. VII - Face is symmetrical.  VIII - Hearing is present. IX, X, XII - Palate is symmetrical.   XI - Shoulder shrugging and head turning intact  Motor: The patient has a mild right hemiparesis, about 4/5 throughout. Tone is normal. Reflexes are 2+ and symmetrical. Plantars are down going. Gait is abnormal, has a mild right hemiparetic gait. .    Final result (Exam End: 12/9/2019 10:24) Provider Status: Open   Study Result     EXAM: MRI BRAIN WITHOUT CONTRAST  CPT code: 07952     CLINICAL INDICATION/HISTORY: Stroke symptoms, right-sided numbness. History  hypertension.     COMPARISON: CT brain of 8 December 2019.     TECHNIQUE: MRI of the brain was performed by using standard protocol pulse  sequences.     FINDINGS:      There is a small 8 mm focus of restricted diffusion in the mid posterior limb of  the left internal capsule possibly involving the adjacent thalamus. There is  associated abnormal increased signal intensity on long-TR images. There is an  occasional tiny focus of abnormal increased signal intensity on long-TR images  in the deep cerebral white matter, bilaterally. There is no mass, mass effect  or hemorrhage. Cerebrospinal fluid spaces are normal for age. No significant  extra-axial abnormalities are seen. The midline structures are unremarkable.    Flow voids are present in the major vessels at the base of the brain. Mastoids  and visualized paranasal sinuses are clear. The orbits look grossly normal.   The extracranial soft tissues are unremarkable.     IMPRESSION  IMPRESSION:      Small focus acute stroke mid posterior limb left internal capsule possibly  involving a small portion of the adjacent left thalamus. No other ischemic  change is seen and there is no intracranial hemorrhage.     Minimal burden nonspecific white matter lesions, most likely chronic  microvascular ischemic change. Brain otherwise unremarkable for age.            I have reviewed the above imagines myself. CBC:   Lab Results   Component Value Date/Time    WBC 9.2 12/08/2019 07:09 PM    RBC 4.62 (L) 12/08/2019 07:09 PM    HGB 14.4 12/08/2019 07:09 PM    HCT 41.3 12/08/2019 07:09 PM    PLATELET 329 35/26/7163 07:09 PM     BMP:   Lab Results   Component Value Date/Time    Glucose 92 12/08/2019 07:09 PM    Sodium 141 12/08/2019 07:09 PM    Potassium 3.6 12/08/2019 07:09 PM    Chloride 107 12/08/2019 07:09 PM    CO2 28 12/08/2019 07:09 PM    BUN 13 12/08/2019 07:09 PM    Creatinine 1.00 12/08/2019 07:09 PM    Calcium 9.3 12/08/2019 07:09 PM     CMP:   Lab Results   Component Value Date/Time    Glucose 92 12/08/2019 07:09 PM    Sodium 141 12/08/2019 07:09 PM    Potassium 3.6 12/08/2019 07:09 PM    Chloride 107 12/08/2019 07:09 PM    CO2 28 12/08/2019 07:09 PM    BUN 13 12/08/2019 07:09 PM    Creatinine 1.00 12/08/2019 07:09 PM    Calcium 9.3 12/08/2019 07:09 PM    Anion gap 6 12/08/2019 07:09 PM    BUN/Creatinine ratio 13 12/08/2019 07:09 PM     Coagulation: No results found for: PTP, INR, APTT, PTTT, INREXT  Cardiac markers:   Lab Results   Component Value Date/Time     12/08/2019 07:09 PM    CK-MB Index 0.9 12/08/2019 07:09 PM       Assessment:  New left CVA in this man who has risk factors including hypertension, tobacco abuse. Plan:  Continue current medical therapy. Encouraged smoking cessation now.   Encouraged to get a primary doctor. Sincerely,        Cyndi Kuhn.  Sy Harper M.D.

## 2020-01-27 ENCOUNTER — OFFICE VISIT (OUTPATIENT)
Dept: NEUROLOGY | Age: 60
End: 2020-01-27

## 2020-01-27 VITALS
WEIGHT: 178.2 LBS | BODY MASS INDEX: 24.95 KG/M2 | HEART RATE: 96 BPM | HEIGHT: 71 IN | TEMPERATURE: 99.1 F | RESPIRATION RATE: 20 BRPM | OXYGEN SATURATION: 96 % | DIASTOLIC BLOOD PRESSURE: 76 MMHG | SYSTOLIC BLOOD PRESSURE: 106 MMHG

## 2020-01-27 DIAGNOSIS — I63.512 CEREBROVASCULAR ACCIDENT (CVA) DUE TO OCCLUSION OF LEFT MIDDLE CEREBRAL ARTERY (HCC): Primary | ICD-10-CM

## 2020-01-27 DIAGNOSIS — I63.512 CEREBROVASCULAR ACCIDENT (CVA) DUE TO OCCLUSION OF LEFT MIDDLE CEREBRAL ARTERY (HCC): ICD-10-CM

## 2020-01-27 RX ORDER — CLOPIDOGREL BISULFATE 75 MG/1
75 TABLET ORAL DAILY
Qty: 30 TAB | Refills: 2 | Status: SHIPPED | OUTPATIENT
Start: 2020-01-27 | End: 2020-07-21 | Stop reason: SDUPTHER

## 2020-01-27 RX ORDER — LISINOPRIL 10 MG/1
10 TABLET ORAL DAILY
Qty: 30 TAB | Refills: 2 | Status: SHIPPED | OUTPATIENT
Start: 2020-01-27 | End: 2020-07-21 | Stop reason: SDUPTHER

## 2020-01-27 RX ORDER — ATORVASTATIN CALCIUM 80 MG/1
80 TABLET, FILM COATED ORAL
Qty: 30 TAB | Refills: 2 | Status: SHIPPED | OUTPATIENT
Start: 2020-01-27 | End: 2020-07-21 | Stop reason: SDUPTHER

## 2020-01-27 RX ORDER — AMLODIPINE BESYLATE 10 MG/1
10 TABLET ORAL DAILY
Qty: 30 TAB | Refills: 2 | Status: SHIPPED | OUTPATIENT
Start: 2020-01-27 | End: 2020-07-21 | Stop reason: SDUPTHER

## 2020-01-27 NOTE — PROGRESS NOTES
Re:  Anisha Somers up visit     1/27/2020 12:28 PM    SSN: [id number]    Subjective:   Mariel Anguiano is seen in follow up after his left CVA. He's made significant recovery but still with some right sided weakness. Complaining of stiffness on the right. Wants to have some therapy. Medications:    Current Outpatient Medications   Medication Sig Dispense Refill    amLODIPine (NORVASC) 10 mg tablet Take 1 Tab by mouth daily. 30 Tab 2    atorvastatin (LIPITOR) 80 mg tablet Take 1 Tab by mouth nightly. 30 Tab 2    clopidogrel (PLAVIX) 75 mg tab Take 1 Tab by mouth daily. 30 Tab 2    lisinopril (PRINIVIL, ZESTRIL) 10 mg tablet Take 1 Tab by mouth daily. 30 Tab 2    polyethylene glycol (MIRALAX) 17 gram packet Take 1 Packet by mouth daily as needed for Other (constipation). 30 Packet 0    OTHER This is to certify that Aundrea Reid was admitted to [facility] from 12/08/[facility] to 12/09/2019. He may may return to work on 12/16/2019. Please feel free to contact my office if you have any questions or concerns. Thank you.  1 Each 0       Vital signs:    Visit Vitals  /76 (BP 1 Location: Left arm, BP Patient Position: Sitting)   Pulse 96   Temp 99.1 °F (37.3 °C) (Oral)   Resp 20   Ht 5' 11\" (1.803 m)   Wt 80.8 kg (178 lb 3.2 oz)   SpO2 96%   BMI 24.85 kg/m²       Review of Systems:   As above otherwise 11 point review of systems negative including;   Constitutional no fever or chills  Skin denies rash or itching  HEENT  Denies tinnitus, hearing lose  Eyes denies diplopia vision lose  Respiratory denies sortness of breath  Cardiovascular denies chest pain, dyspnea on exertion  Gastrointestinal denies nausea, vomiting, diarrhea, constipation  Genitourinary denies incontinence  Musculoskeletal denies joint pain or swelling  Endocrine denies weight change  Hematology denies easy bruising or bleeding   Neurological as above in HPI      Patient Active Problem List Diagnosis Code    [facility]cerebral [facility]) ([facility] I63.9    Essential hypertension I10    Male erectile disorder N52.9    Tobacco abuse Z72.0         Objective: The patient is awake, alert, and oriented x 4. Fund of knowledge is adequate. Speech is fluent and memory is intact. Cranial Nerves: II - Visual fields are full to confrontation. III, IV, VI - Extraocular movements are intact. There is no nystagmus. V - Facial sensation is intact to pinprick. VII - Face is symmetrical.  VIII - Hearing is present. IX, X, XII - Palate is symmetrical.   XI - Shoulder shrugging and head turning intact  Motor: The patient has a mild right hemiparesis, about 4/5 throughout. Tone is normal. Reflexes are 2+ and symmetrical. Plantars are down going. Gait is abnormal, has a mild right hemiparetic gait.   .    CBC:   Lab Results   Component Value Date/Time    WBC 9.2 12/08/2019 07:09 PM    RBC 4.62 (L) 12/08/2019 07:09 PM    HGB 14.4 12/08/2019 07:09 PM    HCT 41.3 12/08/2019 07:09 PM    PLATELET 134 33/62/5869 07:09 PM     BMP:   Lab Results   Component Value Date/Time    Glucose 92 12/08/2019 07:09 PM    Sodium 141 12/08/2019 07:09 PM    Potassium 3.6 12/08/2019 07:09 PM    Chloride 107 12/08/2019 07:09 PM    CO2 28 12/08/2019 07:09 PM    BUN 13 12/08/2019 07:09 PM    Creatinine 1.00 12/08/2019 07:09 PM    Calcium 9.3 12/08/2019 07:09 PM     CMP:   Lab Results   Component Value Date/Time    Glucose 92 12/08/2019 07:09 PM    Sodium 141 12/08/2019 07:09 PM    Potassium 3.6 12/08/2019 07:09 PM    Chloride 107 12/08/2019 07:09 PM    CO2 28 12/08/2019 07:09 PM    BUN 13 12/08/2019 07:09 PM    Creatinine 1.00 12/08/2019 07:09 PM    Calcium 9.3 12/08/2019 07:09 PM    Anion gap 6 12/08/2019 07:09 PM    BUN/Creatinine ratio 13 12/08/2019 07:09 PM     Coagulation: No results found for: PTP, INR, APTT, PTTT, INREXT  Cardiac markers:   Lab Results   Component Value Date/Time     12/08/2019 07:09 PM    CK-MB Index 0.9 12/08/2019 07:09 PM       Assessment:  New left CVA in this man who has risk factors including hypertension, tobacco abuse. Plan:  Continue current medical therapy. Encouraged smoking cessation now. Encouraged to get a primary doctor. Will send to therapy. Sincerely,        Rosina Matute.  Deena Bunch M.D.

## 2020-01-27 NOTE — TELEPHONE ENCOUNTER
Requested Prescriptions     Pending Prescriptions Disp Refills    amLODIPine (NORVASC) 10 mg tablet 30 Tab 2     Sig: Take 1 Tab by mouth daily.  atorvastatin (LIPITOR) 80 mg tablet 30 Tab 2     Sig: Take 1 Tab by mouth nightly.  clopidogreL (PLAVIX) 75 mg tab 30 Tab 2     Sig: Take 1 Tab by mouth daily.  lisinopril (PRINIVIL, ZESTRIL) 10 mg tablet 30 Tab 2     Sig: Take 1 Tab by mouth daily.

## 2020-01-27 NOTE — PROGRESS NOTES
Lizy Adkins is a 64 y.o. male in today for follow-up on CVA. Patient requests referral to Physical Therapy. 1. Have you been to the ER, urgent care clinic since your last visit? Hospitalized since your last visit? No    2. Have you seen or consulted any other health care providers outside of the [facility] since your last visit? Include any pap smears or colon screening.  No

## 2020-02-03 ENCOUNTER — HOSPITAL ENCOUNTER (OUTPATIENT)
Dept: PHYSICAL THERAPY | Age: 60
Discharge: HOME | End: 2020-02-03
Payer: COMMERCIAL

## 2020-02-03 PROCEDURE — 97161 PT EVAL LOW COMPLEX 20 MIN: CPT

## 2020-02-03 PROCEDURE — 97110 THERAPEUTIC EXERCISES: CPT

## 2020-02-03 NOTE — PROGRESS NOTES
[facility] [employer] [facility]  [address] [place] [address]  [phone] fax    Plan of Care/ Statement of Necessity for Physical Therapy Services    Patient name: Janey Baker Start of Care: 2/3/2020   Referral source: Luis Fernando Elder MD : 1960    Medical Diagnosis: Right hemiparesis ([address] [G81.91]  Payor: Taylor Sood / Plan: [place] [employer]  CAPITATED PT / Product Type: Commerical /  Onset Date: 19    Treatment Diagnosis: right side weakness   Prior Hospitalization: see medical history Provider#: [id number]   Medications: Verified on Patient summary List    Comorbidities:  HTN, tobacco use   Prior Level of Function: Ind with ambulation, working     [employer] of Care and following information is based on the information from the initial evaluation. Assessment/ key information: Pt. Is a 64year old male c/o decreased right side strength following a CVA on 19. He reports having decreased strength and numbness in right hand and foot since this time. He reports sometimes difficulty walking secondary to his foot dragging. 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 He presents with decreased right LE strength compared to left side. He also has decreased right shoulder and  strength. Single leg standing is 7 seconds on left and 3 seconds on right side. FGA score is also limited at 25/30. Skilled PT is medically necessary in order to improve right LE strength and balance for increased ease of ambulation and return to full work duties.      Evaluation Complexity History MEDIUM  Complexity : 1-2 comorbidities / personal factors will impact the outcome/ POC ; Examination MEDIUM Complexity : 3 Standardized tests and measures addressing body structure, function, activity limitation and / or participation in recreation  ;Presentation LOW Complexity : Stable, uncomplicated  ;Clinical Decision Making MEDIUM Complexity : FOTO score of 26-74  Overall Complexity Rating: LOW   Problem List: pain affecting function, decrease ROM, decrease strength, impaired gait/ balance, decrease ADL/ functional abilitiies, decrease activity tolerance, decrease flexibility/ joint mobility and decrease transfer abilities   Treatment Plan may include any combination of the following: Therapeutic exercise, Therapeutic activities, Neuromuscular re-education, Physical agent/modality, Gait/balance training, Manual therapy, Patient education, Self Care training and Functional mobility training  Patient / Family readiness to learn indicated by: asking questions  Persons(s) to be included in education: patient (P)  Barriers to Learning/Limitations: None  Patient Goal (s): to get stronger and to get back to [facility]  Patient Self Reported Health Status: fair  Rehabilitation Potential: good    Short Term Goals: To be accomplished in 1 weeks:  1. Patient will demonstrate compliance with HEP in order to improve right LE strength for increased ease of ambulation. Long Term Goals: To be accomplished in 6 weeks:  1. Patient will improve FOTO score by 10 points in order to demonstrate a significant improvement in function. 2. Patient will improve right hip strength to 4/5 in order to increase ease of ambulation. 3. Patient will improve right PF strength to 5/5 in order to increase ease of working. 4. Patient will improve single leg stability to 10 seconds bilaterally in order to increase ease of ambulation. 5. Patient will improve FGA score to 29/30 in order to increase ease of ambulation. Frequency / Duration: Patient to be seen 2 times per week for 6 weeks.     Patient/ CarPatient/ Caregiver education and instruction: Diagnosis, prognosis, exercises   [x]  Plan of care has been reviewed with ARTURO Loja PT 2/3/2020 11:07 AM    ________________________________________________________________________    I certify that the above Therapy Services are being furnished while the patient is under my care. I agree with the treatment plan and certify that this therapy is necessary.     Physician's Signature:____________Date:_________TIME:________    ** Signature, Date and Time must be completed for valid certification **    Please sign and return to [facility]  [address] [place] [address]  [phone] fax

## 2020-02-03 NOTE — PROGRESS NOTES
PT DAILY TREATMENT NOTE/NEURO EVAL 10-18    Patient Name: Nir Cancino  Date:2/3/2020  : 1960  [x]  Patient  Verified  Payor: SELF PAY / Plan: [facility] SELF PAY / Product Type: Self Pay /    In time: 10:05  Out time: 10:57  Total Treatment Time (min): 52  Visit #: 1 of 12    Medicare/BCBS Only   Total Timed Codes (min):  8 1:1 Treatment Time:  52     Treatment Area: Right hemiparesis (HCC) [G81.91]    SUBJECTIVE  Pain Level (0-10 scale):  5/10  []constant []intermittent []improving []worsening []no change since onset    Any medication changes, allergies to medications, adverse drug reactions, diagnosis change, or new procedure performed?: [x] No    [] Yes (see summary sheet for update)  Subjective functional status/changes:       Current symptoms/Complaints:  19. No PT since having his CVA. Reports walking is not normal. Right foot drags sometimes. Also has tightness in arm, leg, and chest. Numbness in hand and foot. Work Hx:  in [employer]. Concerned about climbing ladders. Also concerned about overhead endurance for a long time.    Pt Goals: to get stronger and to get back to work    OBJECTIVE/EXAMINATION    8 min Therapeutic Exercise:  [x] See flow sheet : HEP   Rationale: increase ROM and increase strength to improve the patients ability to increase ease of ADLs          With   [x] TE   [] TA   [] neuro   [] other: Patient Education: [x] Review HEP    [] Progressed/Changed HEP based on:   [] positioning   [] body mechanics   [] transfers   [] heat/ice application    [] other:      Physical Therapy Evaluation - Neurologic    Gait: [] Normal    [x] Abnormal    Device:      Describe: mild decrease in weight shift to right side    Strength (MMT):  Shoulder L (1-5) R (1-5)   Shoulder Flexion 4 4-   Shoulder Ext 4 4-   Shoulder ABD 4 4-   Shoulder ADD 4 4-   Shoulder IR     Shoulder ER                                               Hip L (1-5) R (1-5)   Hip Flexion 4 4-   Hip Ext 4- 4-   Hip ABD 4- 4-   Hip ADD 4- 4-   Hip ER     Hip IR       Knee L (1-5) R (1-5)   Knee Flexion 5 4-   Knee Extension 5 4+   Ankle PF 4 5   Ankle DF 5 5   Other        strength Left: 60, 45, 45 right: 45, 45, 50    Balance/ Equilibrium:        Optional Tests:       Dynamic Gait Index (24pt scale): Functional Gait Assessment (30pt scale):       Schuster Balance Scale (56pt scale):     Other test /comments:  SLS: right: 3 seconds left: 7 seconds  30 second sit to stand test: 11x  FGA: 25/30    Pain Level (0-10 scale) post treatment:  5/10    ASSESSMENT/Changes in Function:     [x]  See Plan of Care  []  See progress note/recertification  []  See Discharge Summary         Progress towards goals / Updated goals:  See POC    PLAN  []  Upgrade activities as tolerated     [x]  Continue plan of care  []  Update interventions per flow sheet       []  Discharge due to:_  []  Other:_      Jackie Martell, PT 2/3/2020  10:02 AM

## 2020-02-06 ENCOUNTER — TELEPHONE (OUTPATIENT)
Dept: PHYSICAL THERAPY | Age: 60
End: 2020-02-06

## 2020-02-18 ENCOUNTER — APPOINTMENT (OUTPATIENT)
Dept: PHYSICAL THERAPY | Age: 60
End: 2020-02-18
Payer: COMMERCIAL

## 2020-02-20 ENCOUNTER — TELEPHONE (OUTPATIENT)
Dept: NEUROLOGY | Age: 60
End: 2020-02-20

## 2020-02-20 ENCOUNTER — APPOINTMENT (OUTPATIENT)
Dept: PHYSICAL THERAPY | Age: 60
End: 2020-02-20
Payer: COMMERCIAL

## 2020-02-20 NOTE — TELEPHONE ENCOUNTER
Left message on  for pt paper work is finished and ready for  at [facility]

## 2020-02-25 ENCOUNTER — APPOINTMENT (OUTPATIENT)
Dept: PHYSICAL THERAPY | Age: 60
End: 2020-02-25
Payer: COMMERCIAL

## 2020-02-25 ENCOUNTER — TELEPHONE (OUTPATIENT)
Dept: NEUROLOGY | Age: 60
End: 2020-02-25

## 2020-03-09 NOTE — PROGRESS NOTES
[facility] - Toña Cochran  [address] [facility]  [phone] fax    Physical Therapy Discharge Summary    Patient name: Austin Walker Start of Care: 2/3/2020   Referral source: Leyla Long MD : 1960                Medical Diagnosis: Right hemiparesis ([address] [G81.91]  Payor: Adilia Brooke / Plan: [place] [employer]IM  CAPITATED PT / Product Type: Commerical /  Onset Date: 19                Treatment Diagnosis: right side weakness   Prior Hospitalization: see medical history Provider#: [id number]   Medications: Verified on Patient summary List    Comorbidities:  HTN, tobacco use   Prior Level of Function: Ind with ambulation, working        Visits from Start of Care:  1    Missed Visits: 3    Reporting Period : 2/3/20 to 2/3/20    Summary of Care:  Goal:  Patient will improve FOTO score by 10 points in order to demonstrate a significant improvement in function. Status at last note/certification: n/a  Status at discharge: not met    Goal:  Patient will improve right hip strength to 4/5 in order to increase ease of ambulation. Status at last note/certification: 4-/5  Status at discharge: not met    Goal: Patient will improve right PF strength to 5/5 in order to increase ease of working. Status at last note/certification: 4/5  Status at discharge: not met    Goal: Patient will improve single leg stability to 10 seconds bilaterally in order to increase ease of ambulation. Status at last note/certification: 3 seconds  Status at discharge: not met    Goal: Patient will improve FGA score to 29/30 in order to increase ease of ambulation. Status at last note/certification: 94/66  Status at discharge: not met    Pt. Was seen for initial evaluation and then did not return to PT. He was provided with a HEP at evaluation. Goals were unable to be re-assessed secondary to unplanned D/C.        ASSESSMENT/RECOMMENDATIONS:  [x]Discontinue therapy: []Patient has reached or is progressing toward set goals      [x]Patient is non-compliant or has abdicated      []Due to lack of appreciable progress towards set goals    Robin Og, PT 3/9/2020 3:19 PM

## 2020-04-13 ENCOUNTER — VIRTUAL VISIT (OUTPATIENT)
Dept: NEUROLOGY | Age: 60
End: 2020-04-13

## 2020-04-13 VITALS — BODY MASS INDEX: 24.5 KG/M2 | HEIGHT: 71 IN | WEIGHT: 175 LBS

## 2020-04-13 DIAGNOSIS — R25.2 SPASTICITY AS LATE EFFECT OF CEREBROVASCULAR ACCIDENT (CVA): ICD-10-CM

## 2020-04-13 DIAGNOSIS — I69.398 SPASTICITY AS LATE EFFECT OF CEREBROVASCULAR ACCIDENT (CVA): ICD-10-CM

## 2020-04-13 DIAGNOSIS — I63.512 CEREBROVASCULAR ACCIDENT (CVA) DUE TO OCCLUSION OF LEFT MIDDLE CEREBRAL ARTERY (HCC): Primary | ICD-10-CM

## 2020-04-13 RX ORDER — BACLOFEN 20 MG/1
20 TABLET ORAL 3 TIMES DAILY
Qty: 90 TAB | Refills: 5 | Status: SHIPPED | OUTPATIENT
Start: 2020-04-13 | End: 2020-07-21 | Stop reason: SDUPTHER

## 2020-04-13 NOTE — PROGRESS NOTES
Re:  Virginia Cline up visit     4/13/2020 1:38 PM    SSN: [id number]    Subjective:   Barbette Blizzard is seen in follow up after his left CVA. He's made significant recovery but still with some right sided weakness. Complaining of stiffness on the right. Wants to have some therapy. Medications:    Current Outpatient Medications   Medication Sig Dispense Refill    amLODIPine (NORVASC) 10 mg tablet Take 1 Tab by mouth daily. 30 Tab 2    atorvastatin (LIPITOR) 80 mg tablet Take 1 Tab by mouth nightly. 30 Tab 2    clopidogreL (PLAVIX) 75 mg tab Take 1 Tab by mouth daily. 30 Tab 2    lisinopril (PRINIVIL, ZESTRIL) 10 mg tablet Take 1 Tab by mouth daily. 30 Tab 2    polyethylene glycol (MIRALAX) 17 gram packet Take 1 Packet by mouth daily as needed for Other (constipation). 30 Packet 0    OTHER This is to certify that Salomón Johnson was admitted to [facility] from 12/[facility] to 12/09/2019. He may may return to work on 12/16/2019. Please feel free to contact my office if you have any questions or concerns. Thank you.  1 Each 0       Vital signs:    Visit Vitals  Ht 5' 11\" (1.803 m)   Wt 79.4 kg (175 lb)   BMI 24.41 kg/m²       Review of Systems:   As above otherwise 11 point review of systems negative including;   Constitutional no fever or chills  Skin denies rash or itching  HEENT  Denies tinnitus, hearing lose  Eyes denies diplopia vision lose  Respiratory denies sortness of breath  Cardiovascular denies chest pain, dyspnea on exertion  Gastrointestinal denies nausea, vomiting, diarrhea, constipation  Genitourinary denies incontinence  Musculoskeletal denies joint pain or swelling  Endocrine denies weight change  Hematology denies easy bruising or bleeding   Neurological as above in HPI      Patient Active Problem List   Diagnosis Code    CVA (cerebral vascular accident) ([facility] I63.9    Essential hypertension I10    Male erectile disorder N52.9    Tobacco abuse Z72.0         Objective: The patient is awake, alert, and oriented x 4. Fund of knowledge is adequate. Speech is fluent and memory is intact. Cranial Nerves: II  Visual fields are full to confrontation. III, IV, VI  Extraocular movements are intact. There is no nystagmus. VII  Face is symmetrical.  VIII - Hearing is present. IX, X, XII  Palate is symmetrical.   XI - Shoulder shrugging and head turning intact  Motor: The patient has a mild right hemiparesis, about 4/5 throughout. Gait is abnormal, has a mild right hemiparetic gait. .    CBC:   Lab Results   Component Value Date/Time    WBC 9.2 12/08/2019 07:09 PM    RBC 4.62 (L) 12/08/2019 07:09 PM    HGB 14.4 12/08/2019 07:09 PM    HCT 41.3 12/08/2019 07:09 PM    PLATELET 138 06/82/5460 07:09 PM     BMP:   Lab Results   Component Value Date/Time    Glucose 92 12/08/2019 07:09 PM    Sodium 141 12/08/2019 07:09 PM    Potassium 3.6 12/08/2019 07:09 PM    Chloride 107 12/08/2019 07:09 PM    CO2 28 12/08/2019 07:09 PM    BUN 13 12/08/2019 07:09 PM    Creatinine 1.00 12/08/2019 07:09 PM    Calcium 9.3 12/08/2019 07:09 PM     CMP:   Lab Results   Component Value Date/Time    Glucose 92 12/08/2019 07:09 PM    Sodium 141 12/08/2019 07:09 PM    Potassium 3.6 12/08/2019 07:09 PM    Chloride 107 12/08/2019 07:09 PM    CO2 28 12/08/2019 07:09 PM    BUN 13 12/08/2019 07:09 PM    Creatinine 1.00 12/08/2019 07:09 PM    Calcium 9.3 12/08/2019 07:09 PM    Anion gap 6 12/08/2019 07:09 PM    BUN/Creatinine ratio 13 12/08/2019 07:09 PM     Coagulation: No results found for: PTP, INR, APTT, PTTT, INREXT  Cardiac markers:   Lab Results   Component Value Date/Time     12/08/2019 07:09 PM    CK-MB Index 0.9 12/08/2019 07:09 PM       Assessment:  New left CVA in this man who has risk factors including hypertension, tobacco abuse. Plan:  Continue current medical therapy. Encouraged smoking cessation now. Will add some Baclofen for the stiffness. RTC 6 weeks. Sincerely,        Arnold Womcak. Eric Fuller M.D. Consent: Claudia Solis, who was seen by synchronous (real-time) audio-video technology, and/or his healthcare decision maker, is aware that this patient-initiated, Telehealth encounter on 4/13/2020 is a billable service, with coverage as determined by his insurance carrier. He is aware that he may receive a bill and has provided verbal consent to proceed: Yes. I spent at least 25 minutes with this established patient, and >50% of the time was spent counseling and/or coordinating care regarding post stroke recovery, onset stiffness of the right side. 712  Subjective:   Claudia Solis is a 64 y.o. male who was seen for Cerebrovascular Accident      Claudia Solis is a 64 y.o. male being evaluated by a video visit encounter for concerns as above. A caregiver was present when appropriate. Due to this being a TeleHealth encounter (During [place] [address] public [facility] emergency), evaluation of the following organ systems was limited: Vitals/Constitutional/EENT/Resp/CV/GI//MS/Neuro/Skin/Heme-Lymph-Imm. Pursuant to the emergency declaration under the [facility], 1135 waiver authority and the [employer] and [employer] General Act, this Virtual  Visit was conducted, with patient's (and/or legal guardian's) consent, to reduce the patient's risk of exposure to COVID-19 and provide necessary medical care. Services were provided through a video synchronous discussion virtually to substitute for in-person clinic visit. Patient and provider were located at their individual homes.         Anna Suero MD

## 2020-05-27 ENCOUNTER — VIRTUAL VISIT (OUTPATIENT)
Dept: NEUROLOGY | Age: 60
End: 2020-05-27

## 2020-05-27 ENCOUNTER — OFFICE VISIT (OUTPATIENT)
Dept: NEUROLOGY | Age: 60
End: 2020-05-27

## 2020-05-27 VITALS — WEIGHT: 175 LBS | HEIGHT: 71 IN | BODY MASS INDEX: 24.5 KG/M2

## 2020-05-27 VITALS — HEIGHT: 71 IN | BODY MASS INDEX: 24.41 KG/M2

## 2020-05-27 DIAGNOSIS — I63.512 CEREBROVASCULAR ACCIDENT (CVA) DUE TO OCCLUSION OF LEFT MIDDLE CEREBRAL ARTERY (HCC): Primary | ICD-10-CM

## 2020-05-27 DIAGNOSIS — R25.2 SPASTICITY AS LATE EFFECT OF CEREBROVASCULAR ACCIDENT (CVA): ICD-10-CM

## 2020-05-27 DIAGNOSIS — I69.398 SPASTICITY AS LATE EFFECT OF CEREBROVASCULAR ACCIDENT (CVA): ICD-10-CM

## 2020-05-27 NOTE — PROGRESS NOTES
Re:  Titus Must up visit     5/27/2020 9:55 AM    SSN: [id number]    Subjective:   Claudia Solis is seen in follow up after his left CVA. He's made significant recovery but still with some right sided weakness. Complaining of stiffness on the right. The therapy cost $50 co-pay so he didn't continue. The Baclofen helps, but he still has stiffness on the right. Tripping but no falling. Medications:    Current Outpatient Medications   Medication Sig Dispense Refill    baclofen (LIORESAL) 20 mg tablet Take 1 Tab by mouth three (3) times daily. 90 Tab 5    amLODIPine (NORVASC) 10 mg tablet Take 1 Tab by mouth daily. 30 Tab 2    atorvastatin (LIPITOR) 80 mg tablet Take 1 Tab by mouth nightly. 30 Tab 2    clopidogreL (PLAVIX) 75 mg tab Take 1 Tab by mouth daily. 30 Tab 2    lisinopril (PRINIVIL, ZESTRIL) 10 mg tablet Take 1 Tab by mouth daily. 30 Tab 2    polyethylene glycol (MIRALAX) 17 gram packet Take 1 Packet by mouth daily as needed for Other (constipation). 30 Packet 0    OTHER This is to certify that Carolyn Delgado was admitted to [facility] from 12/08/2019 to 12/09/2019. He may may return to work on 12/16/2019. Please feel free to contact my office if you have any questions or concerns. Thank you.  1 Each 0       Vital signs:    Visit Vitals  Ht 5' 11\" (1.803 m)   Wt 79.4 kg (175 lb)   BMI 24.41 kg/m²       Review of Systems:   As above otherwise 11 point review of systems negative including;   Constitutional no fever or chills  Skin denies rash or itching  HEENT  Denies tinnitus, hearing lose  Eyes denies diplopia vision lose  Respiratory denies sortness of breath  Cardiovascular denies chest pain, dyspnea on exertion  Gastrointestinal denies nausea, vomiting, diarrhea, constipation  Genitourinary denies incontinence  Musculoskeletal denies joint pain or swelling  Endocrine denies weight change  Hematology denies easy bruising or bleeding Neurological as above in HPI      Patient Active Problem List   Diagnosis Code    CVA (cerebral vascular accident) ([facility] [address] I63.9    Essential hypertension I10    Male erectile disorder N52.9    Tobacco abuse Z72.0         Objective: The patient is awake, alert, and oriented x 4. Fund of knowledge is adequate. Speech is fluent and memory is intact. Cranial Nerves: II - Visual fields are full to confrontation. III, IV, VI - Extraocular movements are intact. There is no nystagmus. VII - Face is symmetrical.  VIII - Hearing is present. IX, X, XII - Palate is symmetrical.   XI - Shoulder shrugging and head turning intact  Motor: The patient has a mild right hemiparesis, about 4/5 throughout. Gait is abnormal, has a mild right hemiparetic gait.   .    CBC:   Lab Results   Component Value Date/Time    WBC 9.2 12/08/2019 07:09 PM    RBC 4.62 (L) 12/08/2019 07:09 PM    HGB 14.4 12/08/2019 07:09 PM    HCT 41.3 12/08/2019 07:09 PM    PLATELET 096 84/98/5787 07:09 PM     BMP:   Lab Results   Component Value Date/Time    Glucose 92 12/08/2019 07:09 PM    Sodium 141 12/08/2019 07:09 PM    Potassium 3.6 12/08/2019 07:09 PM    Chloride 107 12/08/2019 07:09 PM    CO2 28 12/08/2019 07:09 PM    BUN 13 12/08/2019 07:09 PM    Creatinine 1.00 12/08/2019 07:09 PM    Calcium 9.3 12/08/2019 07:09 PM     CMP:   Lab Results   Component Value Date/Time    Glucose 92 12/08/2019 07:09 PM    Sodium 141 12/08/2019 07:09 PM    Potassium 3.6 12/08/2019 07:09 PM    Chloride 107 12/08/2019 07:09 PM    CO2 28 12/08/2019 07:09 PM    BUN 13 12/08/2019 07:09 PM    Creatinine 1.00 12/08/2019 07:09 PM    Calcium 9.3 12/08/2019 07:09 PM    Anion gap 6 12/08/2019 07:09 PM    BUN/Creatinine ratio 13 12/08/2019 07:09 PM     Coagulation: No results found for: PTP, INR, APTT, PTTT, INREXT  Cardiac markers:   Lab Results   Component Value Date/Time     12/08/2019 07:09 PM    CK-MB Index 0.9 12/08/2019 07:09 PM       Assessment:  New left CVA in this man who has risk factors including hypertension, tobacco abuse. Plan:  Continue current medical therapy. Encouraged smoking continued cessation now. Will continue the Baclofen for the stiffness. RTC 6 months. Sincerely,        Irena Gardner. Jordan Delgado M.D. Consent: Rosana Ashley, who was seen by synchronous (real-time) audio-video technology, and/or his healthcare decision maker, is aware that this patient-initiated, Telehealth encounter on 5/27/2020 is a billable service, with coverage as determined by his insurance carrier. He is aware that he may receive a bill and has provided verbal consent to proceed: Yes. I spent at least 25 minutes with this established patient, and >50% of the time was spent counseling and/or coordinating care regarding post stroke recovery, onset stiffness of the right side. 712  Subjective:   Rosana Ashley is a 64 y.o. male who was seen for Cerebrovascular Accident      Rosana Ashley is a 64 y.o. male being evaluated by a video visit encounter for concerns as above. A caregiver was present when appropriate. Due to this being a TeleHealth encounter ([facility]), evaluation of the following organ systems was limited: Vitals/Constitutional/EENT/Resp/CV/GI//MS/Neuro/Skin/Heme-Lymph-Imm. Pursuant to the emergency declaration under the [facility], 1135 waiver authority and the [employer] and [employer] General Act, this Virtual  Visit was conducted, with patient's (and/or legal guardian's) consent, to reduce the patient's risk of exposure to COVID-19 and provide necessary medical care. Services were provided through a video synchronous discussion virtually to substitute for in-person clinic visit. Patient and provider were located at their individual homes.         Merced Rae MD

## 2020-05-27 NOTE — PROGRESS NOTES
Leonides Lui is a 64 y.o. male on virtual visit for follow-up on CVA. Mobile number [phone] will be used for today's visit. 1. Have you been to the ER, urgent care clinic since your last visit? Hospitalized since your last visit? No    2. Have you seen or consulted any other health care providers outside of the [address] [place] [address] since your last visit? Include any pap smears or colon screening.  No

## 2020-07-21 DIAGNOSIS — R25.2 SPASTICITY AS LATE EFFECT OF CEREBROVASCULAR ACCIDENT (CVA): ICD-10-CM

## 2020-07-21 DIAGNOSIS — I63.512 CEREBROVASCULAR ACCIDENT (CVA) DUE TO OCCLUSION OF LEFT MIDDLE CEREBRAL ARTERY (HCC): ICD-10-CM

## 2020-07-21 DIAGNOSIS — I69.398 SPASTICITY AS LATE EFFECT OF CEREBROVASCULAR ACCIDENT (CVA): ICD-10-CM

## 2020-07-21 RX ORDER — BACLOFEN 20 MG/1
20 TABLET ORAL 3 TIMES DAILY
Qty: 90 TAB | Refills: 5 | Status: SHIPPED | OUTPATIENT
Start: 2020-07-21 | End: 2020-11-04 | Stop reason: SDUPTHER

## 2020-07-21 RX ORDER — ATORVASTATIN CALCIUM 80 MG/1
80 TABLET, FILM COATED ORAL
Qty: 30 TAB | Refills: 2 | Status: SHIPPED | OUTPATIENT
Start: 2020-07-21 | End: 2020-11-04 | Stop reason: SDUPTHER

## 2020-07-21 RX ORDER — AMLODIPINE BESYLATE 10 MG/1
10 TABLET ORAL DAILY
Qty: 30 TAB | Refills: 2 | Status: SHIPPED | OUTPATIENT
Start: 2020-07-21 | End: 2020-11-04 | Stop reason: SDUPTHER

## 2020-07-21 RX ORDER — LISINOPRIL 10 MG/1
10 TABLET ORAL DAILY
Qty: 30 TAB | Refills: 2 | Status: SHIPPED | OUTPATIENT
Start: 2020-07-21 | End: 2020-11-04 | Stop reason: SDUPTHER

## 2020-07-21 RX ORDER — CLOPIDOGREL BISULFATE 75 MG/1
75 TABLET ORAL DAILY
Qty: 30 TAB | Refills: 2 | Status: SHIPPED | OUTPATIENT
Start: 2020-07-21 | End: 2020-11-04 | Stop reason: SDUPTHER

## 2020-07-21 NOTE — TELEPHONE ENCOUNTER
Requested Prescriptions     Pending Prescriptions Disp Refills    amLODIPine (Norvasc) 10 mg tablet 30 Tab 2     Sig: Take 1 Tab by mouth daily.  atorvastatin (LIPITOR) 80 mg tablet 30 Tab 2     Sig: Take 1 Tab by mouth nightly.  baclofen (LIORESAL) 20 mg tablet 90 Tab 5     Sig: Take 1 Tab by mouth three (3) times daily.  clopidogreL (Plavix) 75 mg tab 30 Tab 2     Sig: Take 1 Tab by mouth daily.  lisinopriL (PRINIVIL, ZESTRIL) 10 mg tablet 30 Tab 2     Sig: Take 1 Tab by mouth daily. Pt has no medication left.

## 2020-08-03 ENCOUNTER — OFFICE VISIT (OUTPATIENT)
Dept: NEUROLOGY | Age: 60
End: 2020-08-03

## 2020-08-03 VITALS
WEIGHT: 172.4 LBS | HEIGHT: 71 IN | SYSTOLIC BLOOD PRESSURE: 98 MMHG | OXYGEN SATURATION: 98 % | TEMPERATURE: 96.1 F | HEART RATE: 74 BPM | DIASTOLIC BLOOD PRESSURE: 76 MMHG | RESPIRATION RATE: 18 BRPM | BODY MASS INDEX: 24.14 KG/M2

## 2020-08-03 DIAGNOSIS — I63.81 LEFT SIDED LACUNAR STROKE (HCC): Primary | ICD-10-CM

## 2020-08-03 NOTE — LETTER
NOTIFICATION RETURN TO WORK / SCHOOL 
 
8/12/2020 12:57 PM 
 
Mr. Baldemar Ansari [address] [address] To Whom It May Concern: 
 
Baldemar Ansari is currently under the care of [facility] for stroke. He has been under the care of the group since December 2019, currently seeing Dr. Cynthia Mcdermott since August 3, 2020. He will return to work with the following restrictions. Patient should not climb stairs repeatedly or multiple times during the workday. If there are questions or concerns please have the patient contact our office. Sincerely, Clifton Lacey MD

## 2020-08-03 NOTE — LETTER
NOTIFICATION RETURN TO WORK / SCHOOL 
 
8/12/2020 1:08 PM 
 
[facility] [address] [place] [id number] To Whom It May Concern: 
 
Codi Frazier is currently under the care of [facility] for stroke. He hs been under the care of the group since December 2019, currently seeing Dr. Charlette Nazario since August 3, 2020. He will return to work with the following restrictions. Patient should not climb stairs repeatedly or multiple time during the workday. He can climb up to 20 steps. If there are questions or concerns please have the patient contact our office. Sincerely, Angelo Duran MD

## 2020-08-03 NOTE — LETTER
NOTIFICATION RETURN TO WORK / SCHOOL 
 
8/3/2020 12:46 PM 
 
Mr. Bria Portillo [address] [address] To Whom It May Concern: 
 
Bria Portillo is currently under the care of [facility]. He will return to work/school on: 8/4/2020 with the following restrictions. No lifting of heavy objects and no climbing up stairs. Patient is recovering from a stroke and has right sided weakness. If there are questions or concerns please have the patient contact our office. Sincerely, Arielle Larry MD

## 2020-08-03 NOTE — PROGRESS NOTES
Eve Fragoso is a 64 y.o. male . presents for Cerebrovascular Accident   . A 64years old male patient medical history of hypertension and left internal capsule and thalamic stroke in December 2019 with mild residual right-sided weakness came for follow-up evaluation. Used to see Dr. Brittany Lara. He came today requesting for a letter to return back to his work. He claims that the left-sided weakness is getting better. But he still has mild weakness. He walks good but if he walks for long distances, might drag his right leg. Mild difficulty climbing stairs and is afraid that he might trip and fall. He has mild right hand weakness but his  strength is getting better. Can lift his upper extremities up. He has numbness over the right hand and right leg. No incontinence. He has occasional pain and stiffness on the right side. He has normal speech. He is still taking aspirin and Plavix. He had CT angiography of the head and neck at the time of admission to the hospital in December 2019; intracranial and extracranial arteries were unremarkable. Echocardiography was unremarkable. His hemoglobin A1c was 5.5. Lipid profile was within normal range. Patient used to smoke 1 pack a day but he has stopped for the past 2 months. Review of Systems   Constitutional: Negative for chills, fever and weight loss. HENT: Negative for hearing loss and tinnitus. Eyes: Negative for blurred vision and double vision. Respiratory: Positive for cough. Negative for hemoptysis, sputum production and shortness of breath. Cardiovascular: Negative for chest pain and leg swelling. Gastrointestinal: Negative for heartburn, nausea and vomiting. Genitourinary: Positive for frequency. Negative for dysuria and urgency. Musculoskeletal: Positive for myalgias (right side). Negative for back pain and neck pain. Skin: Negative for itching (sometimes) and rash.    Neurological: Positive for sensory change (right side) and focal weakness (right side). Negative for dizziness, tremors and headaches. Endo/Heme/Allergies: Does not bruise/bleed easily. Psychiatric/Behavioral: Negative for depression. Past Medical History:   Diagnosis Date    Hypertension        History reviewed. No pertinent surgical history. History reviewed. No pertinent family history.      Social History     Socioeconomic History    Marital status:      Spouse name: Not on file    Number of children: Not on file    Years of education: Not on file    Highest education level: Not on file   Occupational History    Not on file   Social Needs    Financial resource strain: Not on file    Food insecurity     Worry: Not on file     Inability: Not on file    Transportation needs     Medical: Not on file     Non-medical: Not on file   Tobacco Use    Smoking status: Current Every Day Smoker     Packs/day: 1.00     Years: 1.00     Pack years: 1.00    Smokeless tobacco: Never Used   Substance and Sexual Activity    Alcohol use: No    Drug use: Not on file    Sexual activity: Not on file   Lifestyle    Physical activity     Days per week: Not on file     Minutes per session: Not on file    Stress: Not on file   Relationships    Social connections     Talks on phone: Not on file     Gets together: Not on file     Attends Nondenominational service: Not on file     Active member of club or organization: Not on file     Attends meetings of clubs or organizations: Not on file     Relationship status: Not on file    Intimate partner violence     Fear of current or ex partner: Not on file     Emotionally abused: Not on file     Physically abused: Not on file     Forced sexual activity: Not on file   Other Topics Concern    Not on file   Social History Narrative    Not on file        Allergies   Allergen Reactions    Motrin [Ibuprofen] Hives         Current Outpatient Medications   Medication Sig Dispense Refill    amLODIPine (Norvasc) 10 mg tablet Take 1 Tab by mouth daily. 30 Tab 2    atorvastatin (LIPITOR) 80 mg tablet Take 1 Tab by mouth nightly. 30 Tab 2    baclofen (LIORESAL) 20 mg tablet Take 1 Tab by mouth three (3) times daily. 90 Tab 5    clopidogreL (Plavix) 75 mg tab Take 1 Tab by mouth daily. 30 Tab 2    lisinopriL (PRINIVIL, ZESTRIL) 10 mg tablet Take 1 Tab by mouth daily. 30 Tab 2    polyethylene glycol (MIRALAX) 17 gram packet Take 1 Packet by mouth daily as needed for Other (constipation). 30 Packet 0    OTHER This is to certify that Sally Villasenor was admitted to [facility] from 12/08/2019 to 12/09/2019. He may may return to work on 12/16/2019. Please feel free to contact my office if you have any questions or concerns. Thank you. 1 Each 0         Physical Exam  Constitutional:       Appearance: Normal appearance. HENT:      Head: Normocephalic and atraumatic. Mouth/Throat:      Mouth: Mucous membranes are moist.      Pharynx: Oropharynx is clear. No oropharyngeal exudate. Eyes:      Extraocular Movements: Extraocular movements intact. Pupils: Pupils are equal, round, and reactive to light. Neck:      Musculoskeletal: Normal range of motion and neck supple. Pulmonary:      Effort: Pulmonary effort is normal. No respiratory distress. Musculoskeletal: Normal range of motion. Right lower leg: No edema. Left lower leg: No edema. Neurological:      Mental Status: He is alert. Comments: Mental status: Awake, alert, oriented x3, follows simple, complex and inverted commands, no neglect, no extinction to DSS or VSS. Speech and languge: fluent, coherent, and comprehension intact  CN: VFF, EOMI, PERRLA, face sensation intact , no facial asymmetry noted, palate elevation symmetric bilat, SS+SCM 5/5 bilat, tongue midline  Motor: no pronator drift, tone normal throughout, strength 5/5 throughout except slight limited rapid alternating movements of the right fingers.   Sensory: intact to light touch and pinprick throughout  Coordination: FNF, HS accurate w/o dysmetria but slightly decreased rapid alternating movement of the right fingers. DTR: 2+ throughout except the right knee 3/4 and both ankles 1/4, toes downgoing on the left and equivocal on the right. Gait: Normal; mild limitation with toe walking on the right side            No visits with results within 3 Month(s) from this visit. Latest known visit with results is:   Admission on 12/08/2019, Discharged on 12/09/2019   Component Date Value Ref Range Status    Glucose (POC) 12/08/2019 91  70 - 110 mg/dL Final    Comment: (NOTE)  The FDA has indicated that no capillary point of care blood glucose   monitoring systems are approved for use in \"critically ill\" patients,   however they have not defined this population. The College of   American Pathologists has recommended that these devices should not   be used in cases such as severe hypotension, dehydration, shock, and   hyperglycemic-hyperosmolar state, amongst others. Venous or arterial   collection is the recommended specimen for testing these patients.  Sodium 12/08/2019 141  136 - 145 mmol/L Final    Potassium 12/08/2019 3.6  3.5 - 5.5 mmol/L Final    Chloride 12/08/2019 107  100 - 111 mmol/L Final    CO2 12/08/2019 28  21 - 32 mmol/L Final    Anion gap 12/08/2019 6  3.0 - 18 mmol/L Final    Glucose 12/08/2019 92  74 - 99 mg/dL Final    BUN 12/08/2019 13  7.0 - 18 MG/DL Final    Creatinine 12/08/2019 1.00  0.6 - 1.3 MG/DL Final    BUN/Creatinine ratio 12/08/2019 13  12 - 20   Final    GFR est AA 12/08/2019 >60  >60 ml/min/1.73m2 Final    GFR est non-AA 12/08/2019 >60  >60 ml/min/1.73m2 Final    Comment: (NOTE)  Estimated GFR is calculated using the Modification of Diet in Renal   Disease (MDRD) Study equation, reported for both  Americans   (GFRAA) and non- Americans (GFRNA), and normalized to 1.73m2   body surface area.  The physician must decide which value applies to   the patient. The MDRD study equation should only be used in   individuals age 25 or older. It has not been validated for the   following: pregnant women, patients with serious comorbid conditions,   or on certain medications, or persons with extremes of body size,   muscle mass, or nutritional status.  Calcium 12/08/2019 9.3  8.5 - 10.1 MG/DL Final    WBC 12/08/2019 9.2  4.6 - 13.2 K/uL Final    RBC 12/08/2019 4.62* 4.70 - 5.50 M/uL Final    HGB 12/08/2019 14.4  13.0 - 16.0 g/dL Final    HCT 12/08/2019 41.3  36.0 - 48.0 % Final    MCV 12/08/2019 89.4  74.0 - 97.0 FL Final    MCH 12/08/2019 31.2  24.0 - 34.0 PG Final    MCHC 12/08/2019 34.9  31.0 - 37.0 g/dL Final    RDW 12/08/2019 13.3  11.6 - 14.5 % Final    PLATELET 08/23/8695 175  135 - 420 K/uL Final    MPV 12/08/2019 9.8  9.2 - 11.8 FL Final    NEUTROPHILS 12/08/2019 58  40 - 73 % Final    LYMPHOCYTES 12/08/2019 31  21 - 52 % Final    MONOCYTES 12/08/2019 8  3 - 10 % Final    EOSINOPHILS 12/08/2019 3  0 - 5 % Final    BASOPHILS 12/08/2019 0  0 - 2 % Final    ABS. NEUTROPHILS 12/08/2019 5.4  1.8 - 8.0 K/UL Final    ABS. LYMPHOCYTES 12/08/2019 2.8  0.9 - 3.6 K/UL Final    ABS. MONOCYTES 12/08/2019 0.7  0.05 - 1.2 K/UL Final    ABS. EOSINOPHILS 12/08/2019 0.3  0.0 - 0.4 K/UL Final    ABS. BASOPHILS 12/08/2019 0.0  0.0 - 0.1 K/UL Final    DF 12/08/2019 AUTOMATED    Final    BENZODIAZEPINES 12/08/2019 NEGATIVE   NEG   Final    BARBITURATES 12/08/2019 NEGATIVE   NEG   Final    THC (TH-CANNABINOL) 12/08/2019 NEGATIVE   NEG   Final    OPIATES 12/08/2019 NEGATIVE   NEG   Final    PCP(PHENCYCLIDINE) 12/08/2019 NEGATIVE   NEG   Final    COCAINE 12/08/2019 NEGATIVE   NEG   Final    AMPHETAMINES 12/08/2019 NEGATIVE   NEG   Final    METHADONE 12/08/2019 NEGATIVE   NEG   Final    HDSCOM 12/08/2019 (NOTE)   Final    Comment: Specimen analysis was performed without chain of custody handling.     These results should be used for medical purposes only and not for   legal or employment purposes. Unconfirmed screening results must not   be used for non-medical purposes. The cut-off concentration for positive results are as follows:    AMPH     1000 ng/mL  VELVET      200 ng/mL  ADRIAN      200 ng/mL  MARIAH       300 ng/mL  METH      300 ng/mL  OPI       300 ng/mL  PCP        25 ng/mL  THC        50 ng/mL        CK 12/08/2019 219  39 - 308 U/L Final    CK - MB 12/08/2019 2.0  <3.6 ng/ml Final    CK-MB Index 12/08/2019 0.9  0.0 - 4.0 % Final    Troponin-I, QT 12/08/2019 0.04  0.0 - 0.045 NG/ML Final    Comment: The presence of detectable troponin above the reference range indicates myocardial injury which may be due to ischemia, myocarditis, trauma, etc.  Clinical correlation is necessary to establish the significance of this finding. Sequential testing is recommended to determine if the typical rise and fall of cTnI is demonstrated. Note:  Cardiac troponin I has a relatively long half life and may be present well after the CK MB has returned to baseline. The reference range is based on the 99th percentile of the referent population.       Ventricular Rate 12/08/2019 68  BPM Final    Atrial Rate 12/08/2019 68  BPM Final    P-R Interval 12/08/2019 162  ms Final    QRS Duration 12/08/2019 100  ms Final    Q-T Interval 12/08/2019 394  ms Final    QTC Calculation (Bezet) 12/08/2019 418  ms Final    Calculated P Axis 12/08/2019 62  degrees Final    Calculated R Axis 12/08/2019 1  degrees Final    Calculated T Axis 12/08/2019 45  degrees Final    Diagnosis 12/08/2019    Final                    Value:Normal sinus rhythm  Normal ECG  When compared with ECG of 29-SEP-2010 06:09,  ST no longer depressed in Lateral leads  Nonspecific T wave abnormality no longer evident in Lateral leads  QT has shortened  Confirmed by Avinash Cruz MD, ----- ([id number]) on 12/9/2019 6:46:56 PM      CO2, POC 12/08/2019 25* 19 - 24 MMOL/L Final    Glucose, POC 12/08/2019 94  74 - 106 MG/DL Final    BUN, POC 12/08/2019 12  7 - 18 MG/DL Final    Creatinine, POC 12/08/2019 1.0  0.6 - 1.3 MG/DL Final    GFRAA, POC 12/08/2019 >60  >60 ml/min/1.73m2 Final    GFRNA, POC 12/08/2019 >60  >60 ml/min/1.73m2 Final    Comment: Estimated GFR is calculated using the IDMS-traceable Modification of Diet in Renal Disease (MDRD) Study equation, reported for both  Americans (GFRAA) and non- Americans (GFRNA), and normalized to 1.73m2 body surface area. The physician must decide which value applies to the patient. The MDRD study equation should only be used in individuals age 25 or older. It has not been validated for the following: pregnant women, patients with serious comorbid conditions, or on certain medications, or persons with extremes of body size, muscle mass, or nutritional status.  Sodium, POC 12/08/2019 140  136 - 145 MMOL/L Final    Potassium, POC 12/08/2019 3.4* 3.5 - 5.5 MMOL/L Final    Calcium, ionized (POC) 12/08/2019 1.16  1.12 - 1.32 mmol/L Final    Chloride, POC 12/08/2019 102  100 - 108 MMOL/L Final    Anion gap, POC 12/08/2019 17  10 - 20   Final    Hematocrit, POC 12/08/2019 41  36 - 49 % Final    Hemoglobin, POC 12/08/2019 13.9  12 - 16 G/DL Final    Glucose (POC) 12/08/2019 88  70 - 110 mg/dL Final    Comment: (NOTE)  The FDA has indicated that no capillary point of care blood glucose   monitoring systems are approved for use in \"critically ill\" patients,   however they have not defined this population. The College of   American Pathologists has recommended that these devices should not   be used in cases such as severe hypotension, dehydration, shock, and   hyperglycemic-hyperosmolar state, amongst others. Venous or arterial   collection is the recommended specimen for testing these patients.       LA Volume 12/09/2019 39.27  18 - 58 mL Final    Ao Root D 12/09/2019 3.39  cm Final    LVIDd 12/09/2019 3.75* 4.2 - 5.9 cm Final    LVPWd 12/09/2019 1.32* 0.6 - 1.0 cm Final    LVIDs 12/09/2019 2.23  cm Final    IVSd 12/09/2019 1.31* 0.6 - 1.0 cm Final    LVOT d 12/09/2019 2.02  cm Final    LVOT Peak Velocity 12/09/2019 68.12  cm/s Final    LVOT Peak Gradient 12/09/2019 1.9  mmHg Final    LVOT VTI 12/09/2019 15.68  cm Final    MV A Ricardo 12/09/2019 44.04  cm/s Final    MV E Ricardo 12/09/2019 37.57  cm/s Final    MV E/A 12/09/2019 0.85   Final    RVIDd 12/09/2019 4.31  cm Final    LA Vol 4C 12/09/2019 24.09  18 - 58 mL Final    LA Vol 2C 12/09/2019 55.32  18 - 58 mL Final    LA Area 4C 12/09/2019 12.8  cm2 Final    LV Mass AL 12/09/2019 202.5  88 - 224 g Final    LV Mass AL Index 12/09/2019 100.4  49 - 115 g/m2 Final    Mitral Valve E Wave Deceleration T* 12/09/2019 329.8  ms Final    Pulmonary Vein \"A\" Wave Velocity 12/09/2019 14.60  cm/s Final    P Vein A Dur 12/09/2019 120.0  ms Final    LA Vol Index 12/09/2019 19.47  16 - 28 ml/m2 Final    LA Vol Index 12/09/2019 27.43  16 - 28 ml/m2 Final    LA Vol Index 12/09/2019 11.95  16 - 28 ml/m2 Final    Left Ventricular Fractional Shorte* 12/09/2019 33.847547793  % Final    Left Ventricular Outflow Tract Ketty* 12/09/2019 3.7571610291  mmHg Final    Left Ventricular Outflow Tract Ketty* 12/09/2019 6.1887550726  cm/s Final    Mitral Valve Deceleration O'Brien 12/09/2019 4.9920315500   Final    Left Ventricular End Diastolic Vol* 22/17/5028 4.01250633954  mL Final    Left Ventricular End Systolic Volu* 52/55/3960 [id number]  mL Final    Left Ventricular Stroke Volume by * 12/09/2019 72.325518597  mL Final    Glucose (POC) 12/09/2019 101  70 - 110 mg/dL Final    Comment: (NOTE)  The FDA has indicated that no capillary point of care blood glucose   monitoring systems are approved for use in \"critically ill\" patients,   however they have not defined this population.  The College of   American Pathologists has recommended that these devices should not   be used in cases such as severe hypotension, dehydration, shock, and   hyperglycemic-hyperosmolar state, amongst others. Venous or arterial   collection is the recommended specimen for testing these patients.  Hemoglobin A1c 12/09/2019 5.5  4.2 - 5.6 % Final    Comment: (NOTE)  HbA1C Interpretive Ranges  <5.7              Normal  5.7 - 6.4         Consider Prediabetes  >6.5              Consider Diabetes      Est. average glucose 12/09/2019 111  mg/dL Final    Comment: (NOTE)  The eAG should be interpreted with patient characteristics in mind   since ethnicity, interindividual differences, red cell lifespan,   variation in rates of glycation, etc. may affect the validity of the   calculation.  LIPID PROFILE 12/09/2019        Final    Cholesterol, total 12/09/2019 143  <200 MG/DL Final    Triglyceride 12/09/2019 100  <150 MG/DL Final    Comment: The drugs N-acetylcysteine (NAC) and  Metamiszole have been found to cause falsely  low results in this chemical assay. Please  be sure to submit blood samples obtained  BEFORE administration of either of these  drugs to assure correct results.  HDL Cholesterol 12/09/2019 46  40 - 60 MG/DL Final    LDL, calculated 12/09/2019 77  0 - 100 MG/DL Final    VLDL, calculated 12/09/2019 20  MG/DL Final    CHOL/HDL Ratio 12/09/2019 3.1  0 - 5.0   Final    TSH 12/09/2019 4.79* 0.36 - 3.74 uIU/mL Final    T4, Free 12/09/2019 1.0  0.7 - 1.5 NG/DL Final    Glucose (POC) 12/09/2019 107  70 - 110 mg/dL Final    Comment: (NOTE)  The FDA has indicated that no capillary point of care blood glucose   monitoring systems are approved for use in \"critically ill\" patients,   however they have not defined this population. The College of   American Pathologists has recommended that these devices should not   be used in cases such as severe hypotension, dehydration, shock, and   hyperglycemic-hyperosmolar state, amongst others.   Venous or arterial   collection is the recommended specimen for testing these patients. ICD-10-CM ICD-9-CM    1. Left sided lacunar stroke (HCC)  I63.81 434.91     Involving the posterior limb of the IC and small portion of the thalamus     A 64years old male patient here for follow-up of stroke; lacunar stroke involving the left internal capsule [posterior limb] and a small portion of thalamus with residual right-sided weakness. The weakness has improved markedly. He does not have significant limitations currently but when he is walking for some distances, he might trip and has difficulty climbing up stairs. Strength is on physical examination he is 5 out of 5. He works as a  in [employer]. He has been on dual antiplatelet agents for more than 6 months. We will stop his aspirin [patient has an allergy to ibuprofen but did not have any symptoms when taking aspirin]. We will continue the Plavix at 75 mg p.o. per day. He will also continue with the atorvastatin. Advised him to establish a primary care follow-up [does not have any primary care provider at this time]. He can go back to work currently with some restrictions including lifting heavy objects and climbing up stairs multiple times as he is at risk of falls. We will see him in 6 months time. Samm Bedoya

## 2020-08-06 ENCOUNTER — TELEPHONE (OUTPATIENT)
Dept: NEUROLOGY | Age: 60
End: 2020-08-06

## 2020-08-06 NOTE — TELEPHONE ENCOUNTER
Pt states he received a return to work letter from Dr. Larisa Du. However, his employer needs him to specify in the letter no climbing up stairs that have 10-20 steps and that he has been under the doctor's care since June 2020 to the present.

## 2020-08-10 NOTE — TELEPHONE ENCOUNTER
Pt called to check on the status of his request for a revised letter to employer. Pt needs to return letter to work asap. Please see previous note. Please advise.

## 2020-11-04 DIAGNOSIS — I69.398 SPASTICITY AS LATE EFFECT OF CEREBROVASCULAR ACCIDENT (CVA): ICD-10-CM

## 2020-11-04 DIAGNOSIS — I63.512 CEREBROVASCULAR ACCIDENT (CVA) DUE TO OCCLUSION OF LEFT MIDDLE CEREBRAL ARTERY (HCC): ICD-10-CM

## 2020-11-04 DIAGNOSIS — R25.2 SPASTICITY AS LATE EFFECT OF CEREBROVASCULAR ACCIDENT (CVA): ICD-10-CM

## 2020-11-04 NOTE — TELEPHONE ENCOUNTER
Requested Prescriptions     Pending Prescriptions Disp Refills    amLODIPine (Norvasc) 10 mg tablet 30 Tab 2     Sig: Take 1 Tab by mouth daily.  atorvastatin (LIPITOR) 80 mg tablet 30 Tab 2     Sig: Take 1 Tab by mouth nightly.  baclofen (LIORESAL) 20 mg tablet 90 Tab 5     Sig: Take 1 Tab by mouth three (3) times daily.  clopidogreL (Plavix) 75 mg tab 30 Tab 2     Sig: Take 1 Tab by mouth daily.  lisinopriL (PRINIVIL, ZESTRIL) 10 mg tablet 30 Tab 2     Sig: Take 1 Tab by mouth daily.

## 2020-11-06 RX ORDER — LISINOPRIL 10 MG/1
10 TABLET ORAL DAILY
Qty: 30 TAB | Refills: 2 | Status: SHIPPED | OUTPATIENT
Start: 2020-11-06 | End: 2021-02-09 | Stop reason: SDUPTHER

## 2020-11-06 RX ORDER — AMLODIPINE BESYLATE 10 MG/1
10 TABLET ORAL DAILY
Qty: 30 TAB | Refills: 2 | Status: SHIPPED | OUTPATIENT
Start: 2020-11-06 | End: 2021-02-09 | Stop reason: SDUPTHER

## 2020-11-06 RX ORDER — CLOPIDOGREL BISULFATE 75 MG/1
75 TABLET ORAL DAILY
Qty: 30 TAB | Refills: 2 | Status: SHIPPED | OUTPATIENT
Start: 2020-11-06 | End: 2021-02-09 | Stop reason: SDUPTHER

## 2020-11-06 RX ORDER — ATORVASTATIN CALCIUM 80 MG/1
80 TABLET, FILM COATED ORAL
Qty: 30 TAB | Refills: 2 | Status: SHIPPED | OUTPATIENT
Start: 2020-11-06 | End: 2021-02-09 | Stop reason: SDUPTHER

## 2020-11-06 RX ORDER — BACLOFEN 20 MG/1
20 TABLET ORAL 3 TIMES DAILY
Qty: 90 TAB | Refills: 5 | Status: SHIPPED | OUTPATIENT
Start: 2020-11-06 | End: 2021-02-09 | Stop reason: SDUPTHER

## 2020-11-06 NOTE — TELEPHONE ENCOUNTER
Spoke with patient, made him aware of refills and need for follow-up to a PCP. He's asking for a referral. Please advise and generate referral to Mamta Cortes MD at [facility].

## 2021-02-09 DIAGNOSIS — I63.512 CEREBROVASCULAR ACCIDENT (CVA) DUE TO OCCLUSION OF LEFT MIDDLE CEREBRAL ARTERY (HCC): ICD-10-CM

## 2021-02-09 DIAGNOSIS — R25.2 SPASTICITY AS LATE EFFECT OF CEREBROVASCULAR ACCIDENT (CVA): ICD-10-CM

## 2021-02-09 DIAGNOSIS — I69.398 SPASTICITY AS LATE EFFECT OF CEREBROVASCULAR ACCIDENT (CVA): ICD-10-CM

## 2021-02-10 RX ORDER — ATORVASTATIN CALCIUM 80 MG/1
80 TABLET, FILM COATED ORAL
Qty: 30 TAB | Refills: 2 | Status: SHIPPED | OUTPATIENT
Start: 2021-02-10

## 2021-02-10 RX ORDER — LISINOPRIL 10 MG/1
10 TABLET ORAL DAILY
Qty: 30 TAB | Refills: 2 | Status: SHIPPED | OUTPATIENT
Start: 2021-02-10

## 2021-02-10 RX ORDER — CLOPIDOGREL BISULFATE 75 MG/1
75 TABLET ORAL DAILY
Qty: 30 TAB | Refills: 2 | Status: SHIPPED | OUTPATIENT
Start: 2021-02-10

## 2021-02-10 RX ORDER — AMLODIPINE BESYLATE 10 MG/1
10 TABLET ORAL DAILY
Qty: 30 TAB | Refills: 2 | Status: SHIPPED | OUTPATIENT
Start: 2021-02-10

## 2021-02-10 RX ORDER — BACLOFEN 20 MG/1
20 TABLET ORAL 3 TIMES DAILY
Qty: 90 TAB | Refills: 5 | Status: SHIPPED | OUTPATIENT
Start: 2021-02-10

## 2023-01-25 NOTE — INTERDISCIPLINARY ROUNDS
Interdisciplinary STROKE Rounds Recommendations:  
 
Recommendations are as follows: 1. Dr. Esthela Porter will see the patient this morning. Per Melissa Carroll, he has voiced concerns about being here and may possible go AMA. 2. Labs changed to this morning--A1C and Lipid panel. 3. Continue education about stroke risk factors, stroke recognition and medication compliance. Stroke book has not been given yet as pt has just arrived and labs are not done. Melissa Carroll is working on this. Stroke Meds currently prescribed: ASA 81, Lipitor 80 mg 
DVT prophylaxis: heparin sq Tests ordered:MRI, Echo Discharge Plan: TBD, will be seen by therapy today Patient admitted for: RS weakness and tingling Advanced Imaging TBD today Discipline Participation: Interdisciplinary rounds were conducted by: 
Dr. Kaushik Blue, Neuroscience Medical director,  
Dr. Esthela Porter, Neurologist on Call this week, 
Ranjith Aleman, Stroke Coordinator Eli Mart PT, Read KRIS Tubbs liaison Melissa Carroll RN, the patient's nurse. Discussion included patient's current condition, any tests and patient's expected discharge outcome.
4 or more times a week